# Patient Record
Sex: MALE | Race: WHITE | NOT HISPANIC OR LATINO | Employment: FULL TIME | URBAN - METROPOLITAN AREA
[De-identification: names, ages, dates, MRNs, and addresses within clinical notes are randomized per-mention and may not be internally consistent; named-entity substitution may affect disease eponyms.]

---

## 2018-01-11 NOTE — RESULT NOTES
Verified Results  * XR KNEE 4+ VIEW RIGHT [id number] 12:40PM Lissette Pineda Order Number: [id number]     Test Name Result Flag Reference   XR KNEE 4+ VW RIGHT (Report)     RIGHT KNEE     INDICATION: Knee pain  History of microfractures in both knees  COMPARISON: None     VIEWS: 4; 4 images     FINDINGS:     There is no acute fracture or dislocation  There is no joint effusion  There is some narrowing of the medial joint space as well as tiny marginal bone spur formation medially at the tibial plateau and femoral condyle  This is compatible with mild osteoarthritis  The lateral joint space and patellofemoral joint appear    preserved  All there is no discrete lytic or blastic bone lesion identified, the medial femoral condyle appears to have mixed sclerosis and relative lucency with a lucency noted to be just beneath the cortical surface in the weightbearing portion of the bone  This   might represent changes related to osteochondral defect  An MRI would provide increased sensitivity for detection and characterization  Soft tissues are unremarkable  IMPRESSION:     Mild osteoarthritis of the medial joint space  Questionable changes of osteochondral defect of the medial femoral condyle  MRI might be helpful for further characterization         Workstation performed: [id number]     Signed by:   Quintin Newberry MD   3/13/16

## 2018-01-11 NOTE — RESULT NOTES
Message   Recorded as Task   Date: 03/14/2016 10:07 AM, Created By: Dami Galvan   Task Name: Call Patient with results   Assigned To: Miguel Turner   Regarding Patient: Dayna Fam, Status: Active   CommentJfredy Coombs - 14 Mar 2016 10:07 AM     Patient Phone: [phone]    give results, get progress report- if still haviing pain, may need MRI   Sandip Vallecillo - 15 Mar 2016 2:32 PM     TASK EDITED  Saw Dr Jovan Rangel- Ortho, Had Cortisone injection and Given Rx for anti-inflammatory  Dr Jovan Rangel will be taking over care of his knee from here     FYI to PM        Signatures   Electronically signed by : Blu Cruz, ; Mar 15 2016  2:32PM EST                       (Author)

## 2018-03-14 ENCOUNTER — OFFICE VISIT (OUTPATIENT)
Dept: FAMILY MEDICINE CLINIC | Facility: CLINIC | Age: 58
End: 2018-03-14
Payer: COMMERCIAL

## 2018-03-14 VITALS
BODY MASS INDEX: 25.01 KG/M2 | WEIGHT: 165 LBS | HEART RATE: 64 BPM | SYSTOLIC BLOOD PRESSURE: 120 MMHG | RESPIRATION RATE: 16 BRPM | DIASTOLIC BLOOD PRESSURE: 80 MMHG | HEIGHT: 68 IN | OXYGEN SATURATION: 97 %

## 2018-03-14 DIAGNOSIS — Z12.11 ENCOUNTER FOR SCREENING COLONOSCOPY: ICD-10-CM

## 2018-03-14 DIAGNOSIS — Z13.6 SCREENING FOR CARDIOVASCULAR CONDITION: ICD-10-CM

## 2018-03-14 DIAGNOSIS — Z12.5 ENCOUNTER FOR PROSTATE CANCER SCREENING: ICD-10-CM

## 2018-03-14 DIAGNOSIS — Z00.00 ENCOUNTER FOR WELLNESS EXAMINATION IN ADULT: Primary | ICD-10-CM

## 2018-03-14 DIAGNOSIS — Z11.59 ENCOUNTER FOR HEPATITIS C SCREENING TEST FOR LOW RISK PATIENT: ICD-10-CM

## 2018-03-14 PROBLEM — M17.0 PRIMARY OSTEOARTHRITIS OF BOTH KNEES: Status: ACTIVE | Noted: 2018-03-14

## 2018-03-14 PROCEDURE — 99396 PREV VISIT EST AGE 40-64: CPT | Performed by: FAMILY MEDICINE

## 2018-03-14 NOTE — PROGRESS NOTES
HPI:  Shira Valdez is a 60 y o  male here for his yearly health maintenance exam    Patient Active Problem List   Diagnosis    Primary osteoarthritis of both knees     No past medical history on file  1  Advanced Directive: no     2  Durable Power of  for Healthcare: no     3  Social History:               Marital History:               Work Status: full time              Drug and alcohol History: no drugs              Alcohol Use: 1 beer/day     4  General Health: good              Regular Dental Visits:yes              Vision problems:no              Hearing Loss:no              Immunizations up to date:  yes                 Lifestyle:                           Healthy Diet:yes                          Tobacco Stacia Goldsteing                          Regular exercise:yes                          Weight concerns:no                          Drug abuse: no     5  Reproductive Health (females only)              Premenopausal:-              Perimenopausal:-              Postmenopausal: -                 Menstrual Problems: -              Contraceptions: -              Sexually Active:yes              Pregnancy History:-     6  Over the past 2 weeks, how often have you been bothered by the following:              Little interest or pleasure in doing things:not at all              Felling down, depressed or hopeless: not at all    No current outpatient prescriptions on file  No current facility-administered medications for this visit        No Known Allergies  Immunization History   Administered Date(s) Administered    Influenza Quadrivalent Preservative Free 3 years and older IM 10/15/2014    Tdap 08/31/2012    Tuberculin Skin Test-PPD Intradermal 09/05/2012       Patient Care Team:  Rosemary Timmons MD as PCP - General (Family Medicine)      Physical Exam :/80 (BP Location: Left arm, Patient Position: Sitting, Cuff Size: Standard)   Pulse 64   Resp 16   Ht 5' 8" (1 727 m)   Wt 74 8 kg (165 lb) SpO2 97%   BMI 25 09 kg/m²     General Appearance:    Alert, cooperative, no distress, appears stated age   Head:    Normocephalic, without obvious abnormality, atraumatic   Eyes:    PERRL, conjunctiva/corneas clear, EOM's intact, fundi     benign, both eyes        Ears:    Normal TM's and external ear canals, both ears   Nose:   Nares normal, septum midline, mucosa normal, no drainage    or sinus tenderness   Throat:   Lips, mucosa, and tongue normal; teeth and gums normal   Neck:   Supple, symmetrical, trachea midline, no adenopathy;        thyroid:  No enlargement/tenderness/nodules; no carotid    bruit or JVD   Lungs:     Clear to auscultation bilaterally, respirations unlabored   Chest wall:    No tenderness or deformity   Heart:    Regular rate and rhythm, S1 and S2 normal, no murmur, rub   or gallop   Abdomen:     Soft, non-tender, bowel sounds active all four quadrants,     no masses, no organomegaly   Extremities:   Extremities normal, atraumatic, no cyanosis or edema   Pulses:   2+ and symmetric all extremities   Skin:   Skin color, texture, turgor normal, no rashes or lesions   Lymph nodes:   Cervical and supraclavicular nodes normal   Neurologic:   CNII-XII intact  Normal strength, sensation and reflexes       throughout       Physical Exam     Reviewed Updated [facility] Prior Wellness Visits:   Last Health Maintenance visit information was reviewed, patient interviewed , no change since last  visit n/a  Last  visit information was reviewed, patient interviewed and updates made to the record today n/a    Assessment and Plan:  1  Encounter for wellness examination in adult     2  Encounter for hepatitis C screening test for low risk patient  Hepatitis C Ab W/Refl To HCV RNA, Qn, PCR    Hepatitis C Ab W/Refl To HCV RNA, Qn, PCR   3  Encounter for prostate cancer screening  PSA    PSA   4  Encounter for screening colonoscopy  Ambulatory referral to Gastroenterology   5   Screening for cardiovascular condition  Comprehensive metabolic panel    Lipid panel    Comprehensive metabolic panel    Lipid panel       There are no preventive care reminders to display for this patient  Patient Instructions   Health maintenance exam-will screen for hepatitis-C, hyperlipidemia, prostate cancer as discussed  Referral for repeat colonoscopy due to previous polyps  Advanced directives were discussed and forms are provided  Should have routine eye exams every 1-2 years  Maintain healthy lifestyle and diet  Vaccines are current

## 2018-03-14 NOTE — PATIENT INSTRUCTIONS
Health maintenance exam-will screen for hepatitis-C, hyperlipidemia, prostate cancer as discussed  Referral for repeat colonoscopy due to previous polyps  Advanced directives were discussed and forms are provided  Should have routine eye exams every 1-2 years  Maintain healthy lifestyle and diet  Vaccines are current

## 2018-03-28 ENCOUNTER — TELEPHONE (OUTPATIENT)
Dept: FAMILY MEDICINE CLINIC | Facility: CLINIC | Age: 58
End: 2018-03-28

## 2018-03-28 LAB
ALBUMIN SERPL-MCNC: 4.4 G/DL (ref 3.6–5.1)
ALBUMIN/GLOB SERPL: 2 (CALC) (ref 1–2.5)
ALP SERPL-CCNC: 32 U/L (ref 40–115)
ALT SERPL-CCNC: 17 U/L (ref 9–46)
AST SERPL-CCNC: 21 U/L (ref 10–35)
BILIRUB SERPL-MCNC: 0.6 MG/DL (ref 0.2–1.2)
BUN SERPL-MCNC: 23 MG/DL (ref 7–25)
BUN/CREAT SERPL: ABNORMAL (CALC) (ref 6–22)
CALCIUM SERPL-MCNC: 9.2 MG/DL (ref 8.6–10.3)
CHLORIDE SERPL-SCNC: 103 MMOL/L (ref 98–110)
CHOLEST SERPL-MCNC: 209 MG/DL
CHOLEST/HDLC SERPL: 2.6 (CALC)
CO2 SERPL-SCNC: 31 MMOL/L (ref 20–31)
CREAT SERPL-MCNC: 1.05 MG/DL (ref 0.7–1.33)
GLOBULIN SER CALC-MCNC: 2.2 G/DL (CALC) (ref 1.9–3.7)
GLUCOSE SERPL-MCNC: 91 MG/DL (ref 65–99)
HCV AB S/CO SERPL IA: 0.02
HCV AB SERPL QL IA: NORMAL
HDLC SERPL-MCNC: 80 MG/DL
LDLC SERPL CALC-MCNC: 110 MG/DL (CALC)
NONHDLC SERPL-MCNC: 129 MG/DL (CALC)
POTASSIUM SERPL-SCNC: 4.6 MMOL/L (ref 3.5–5.3)
PROT SERPL-MCNC: 6.6 G/DL (ref 6.1–8.1)
PSA SERPL-MCNC: 0.8 NG/ML
SL AMB EGFR AFRICAN AMERICAN: 90 ML/MIN/1.73M2
SL AMB EGFR NON AFRICAN AMERICAN: 77 ML/MIN/1.73M2
SODIUM SERPL-SCNC: 140 MMOL/L (ref 135–146)
TRIGL SERPL-MCNC: 92 MG/DL

## 2018-03-28 NOTE — TELEPHONE ENCOUNTER
PATIENT NOTIFIED AS PER PM      ----- Message from Kofi Seay MD sent at 3/28/2018  1:04 PM EDT -----  Call patient with lab result-  Labs look good  Borderline cholesterol, no meds needed, needs to improve her diet  Recheck 1 year

## 2018-03-28 NOTE — PROGRESS NOTES
Call patient with lab result-  Labs look good  Borderline cholesterol, no meds needed, needs to improve her diet  Recheck 1 year

## 2018-10-01 ENCOUNTER — OFFICE VISIT (OUTPATIENT)
Dept: FAMILY MEDICINE CLINIC | Facility: CLINIC | Age: 58
End: 2018-10-01
Payer: COMMERCIAL

## 2018-10-01 VITALS
SYSTOLIC BLOOD PRESSURE: 124 MMHG | WEIGHT: 160 LBS | BODY MASS INDEX: 24.25 KG/M2 | HEART RATE: 65 BPM | OXYGEN SATURATION: 97 % | DIASTOLIC BLOOD PRESSURE: 74 MMHG | HEIGHT: 68 IN

## 2018-10-01 DIAGNOSIS — Z11.1 SCREENING EXAMINATION FOR PULMONARY TUBERCULOSIS: Primary | ICD-10-CM

## 2018-10-01 DIAGNOSIS — L98.9 SKIN LESION: ICD-10-CM

## 2018-10-01 PROCEDURE — 86580 TB INTRADERMAL TEST: CPT

## 2018-10-01 PROCEDURE — 99213 OFFICE O/P EST LOW 20 MIN: CPT | Performed by: FAMILY MEDICINE

## 2018-10-01 NOTE — PROGRESS NOTES
[id number]         NAME: Maxwell Morrow is a 60 y o  male  : 1960    MRN: [id number]  DATE: 2018  TIME: 8:43 AM    Assessment and Plan   Screening examination for pulmonary tuberculosis [Z11 1]  1  Screening examination for pulmonary tuberculosis  TB Skin Test   2  Skin lesion           Patient Instructions     Patient Instructions   L flank lesion---? Basal cell--ck 6 [id number]          Chief Complaint     Chief Complaint   Patient presents with    employment PE     needs PPD         History of Present Illness       Work clearance        Review of Systems   Review of Systems   Constitutional: Negative for fatigue, fever and unexpected weight change  HENT: Negative for congestion, sinus pain and sore throat  Eyes: Negative for visual disturbance  Respiratory: Negative for shortness of breath and wheezing  Cardiovascular: Negative for chest pain and palpitations  Gastrointestinal: Negative for abdominal pain, nausea and vomiting  Musculoskeletal: Negative  Negative for arthralgias and myalgias  Neurological: Negative for syncope, weakness and numbness  Psychiatric/Behavioral: Negative  Negative for confusion, dysphoric mood and suicidal ideas  Current Medications     No current outpatient prescriptions on file  Current Allergies     Allergies as of 10/01/2018    (No Known Allergies)            The following portions of the patient's history were reviewed and updated as appropriate: allergies, current medications, past family history, past medical history, past social history, past surgical history and problem list      No past medical history on file  No past surgical history on file  Family History   Problem Relation Age of Onset    Diabetes Father     Substance Abuse Neg Hx     Mental illness Neg Hx          Medications have been verified          Objective   /74   Pulse 65   Ht 5' 8" (1 727 m)   Wt 72 6 kg (160 lb)   SpO2 97%   BMI 24 33 kg/m²        Physical Exam     Physical Exam   Constitutional: He is oriented to person, place, and time  Vital signs are normal  He appears well-developed and well-nourished  HENT:   Right Ear: Ear canal normal  Tympanic membrane is not injected  Left Ear: Ear canal normal  Tympanic membrane is not injected  Nose: Nose normal    Mouth/Throat: Oropharynx is clear and moist    Eyes: Pupils are equal, round, and reactive to light  Conjunctivae and EOM are normal  Right eye exhibits no discharge  Left eye exhibits no discharge  Neck: Normal range of motion  Neck supple  No thyromegaly present  Cardiovascular: Normal rate, regular rhythm and normal heart sounds  No murmur heard  Pulmonary/Chest: Effort normal and breath sounds normal  No respiratory distress  He has no wheezes  Abdominal: Soft  Bowel sounds are normal  He exhibits no distension  There is no tenderness  Musculoskeletal: Normal range of motion  Lymphadenopathy:     He has no cervical adenopathy  Neurological: He is alert and oriented to person, place, and time  He has normal strength and normal reflexes  He is not disoriented  No sensory deficit  Gait normal    Skin: Skin is warm and dry  Lesion L flank--? Basal cell   Psychiatric: He has a normal mood and affect   His speech is normal and behavior is normal  Judgment and thought content normal  Cognition and memory are normal

## 2018-10-03 LAB
INDURATION: 0 MM
TB SKIN TEST: NEGATIVE

## 2018-11-26 ENCOUNTER — TELEPHONE (OUTPATIENT)
Dept: FAMILY MEDICINE CLINIC | Facility: CLINIC | Age: 58
End: 2018-11-26

## 2018-11-26 NOTE — TELEPHONE ENCOUNTER
SPOKE WITH PATIENT OFFERED APPT NEXT DAY AT 930AM-- PT STATES CANNOT MAKE IT-- OFFERED THAT PATIENT CAN ALSO BEEN SEEN IN URGENT CARE IF WOULD LIKE TO BE SEEN TODAY

## 2020-03-10 ENCOUNTER — TELEPHONE (OUTPATIENT)
Dept: ADMINISTRATIVE | Facility: OTHER | Age: 60
End: 2020-03-10

## 2020-03-10 NOTE — TELEPHONE ENCOUNTER
----- [facility] sent at 3/9/2020 11:51 AM EDT -----  Regarding: COLONOSCOPY  Contact: [phone]  03/09/20 11:51 AM    Hello, our patient Breezy Hedrick has had CRC: Colonoscopy completed/performed  Please assist in updating the patient chart by pulling the document from the Media Tab  The date of service is 04/26/2018       Thank you,  Mary Montgomery  [facility]

## 2020-03-10 NOTE — LETTER
Procedure Request Form: Colonoscopy      Date Requested: 03/10/20  Patient: Annye Cebolla  Patient : 1960   Referring Provider: Regi Poisson, MD        Date of Procedure ___18 Procedure notes___________________________       The above patient has informed us that they have completed their   most recent Colonoscopy at your facility  Please complete   this form and attach all corresponding procedure reports/results  Comments __________________________________________________________  ____________________________________________________________________  ____________________________________________________________________  ____________________________________________________________________    Facility Completing Procedure _________________________________________    Form Completed By (print name) _______________________________________      Signature __________________________________________________________      These reports are needed for  compliance    Please fax this completed form and a copy of the procedure report to our office located at [place] [address] as soon as possible to [phone] attention Gerson Billy: Phone [phone]    We thank you for your assistance in treating our mutual patient

## 2020-03-10 NOTE — TELEPHONE ENCOUNTER
Upon review of the In Basket request and the patient's chart, initial outreach has been made via fax, please see Contacts section for details  A second outreach attempt will be made within 3 business days    Daria Wood   [phone]       Thank you  Yolanda Calvert MA

## 2020-03-17 NOTE — TELEPHONE ENCOUNTER
Upon review of the In Basket request we were able to locate, review, and update the patient chart as requested for CRC: Colonoscopy  Any additional questions or concerns should be emailed to the Practice Liaisons via [email or web address] email, please do not reply via In Basket      Thank you  Henry Humphreys MA

## 2020-06-12 ENCOUNTER — TELEPHONE (OUTPATIENT)
Dept: FAMILY MEDICINE CLINIC | Facility: CLINIC | Age: 60
End: 2020-06-12

## 2020-06-12 DIAGNOSIS — Z12.5 SCREENING PSA (PROSTATE SPECIFIC ANTIGEN): ICD-10-CM

## 2020-06-12 DIAGNOSIS — E78.5 HYPERLIPIDEMIA, UNSPECIFIED HYPERLIPIDEMIA TYPE: Primary | ICD-10-CM

## 2020-06-24 ENCOUNTER — OFFICE VISIT (OUTPATIENT)
Dept: FAMILY MEDICINE CLINIC | Facility: CLINIC | Age: 60
End: 2020-06-24
Payer: COMMERCIAL

## 2020-06-24 VITALS
WEIGHT: 155 LBS | HEIGHT: 68 IN | SYSTOLIC BLOOD PRESSURE: 122 MMHG | BODY MASS INDEX: 23.49 KG/M2 | TEMPERATURE: 97.5 F | OXYGEN SATURATION: 96 % | DIASTOLIC BLOOD PRESSURE: 74 MMHG | HEART RATE: 70 BPM

## 2020-06-24 DIAGNOSIS — Z00.00 ANNUAL PHYSICAL EXAM: Primary | ICD-10-CM

## 2020-06-24 LAB
ALBUMIN SERPL-MCNC: 4.2 G/DL (ref 3.8–4.8)
ALBUMIN/GLOB SERPL: 2.5 {RATIO} (ref 1.2–2.2)
ALP SERPL-CCNC: 37 IU/L (ref 39–117)
ALT SERPL-CCNC: 19 IU/L (ref 0–44)
AST SERPL-CCNC: 23 IU/L (ref 0–40)
BILIRUB SERPL-MCNC: 0.5 MG/DL (ref 0–1.2)
BUN SERPL-MCNC: 19 MG/DL (ref 8–27)
BUN/CREAT SERPL: 22 (ref 10–24)
CALCIUM SERPL-MCNC: 8.8 MG/DL (ref 8.6–10.2)
CHLORIDE SERPL-SCNC: 101 MMOL/L (ref 96–106)
CHOLEST SERPL-MCNC: 193 MG/DL (ref 100–199)
CO2 SERPL-SCNC: 26 MMOL/L (ref 20–29)
CREAT SERPL-MCNC: 0.87 MG/DL (ref 0.76–1.27)
GLOBULIN SER-MCNC: 1.7 G/DL (ref 1.5–4.5)
GLUCOSE SERPL-MCNC: 89 MG/DL (ref 65–99)
HDLC SERPL-MCNC: 67 MG/DL
LDLC SERPL CALC-MCNC: 94 MG/DL (ref 0–99)
LDLC/HDLC SERPL: 1.4 RATIO (ref 0–3.6)
POTASSIUM SERPL-SCNC: 4.4 MMOL/L (ref 3.5–5.2)
PROT SERPL-MCNC: 5.9 G/DL (ref 6–8.5)
PSA SERPL-MCNC: 0.8 NG/ML (ref 0–4)
SL AMB EGFR AFRICAN AMERICAN: 108 ML/MIN/1.73
SL AMB EGFR NON AFRICAN AMERICAN: 93 ML/MIN/1.73
SL AMB REFLEX CRITERIA: NORMAL
SL AMB VLDL CHOLESTEROL CALC: 32 MG/DL (ref 5–40)
SODIUM SERPL-SCNC: 139 MMOL/L (ref 134–144)
TRIGL SERPL-MCNC: 158 MG/DL (ref 0–149)

## 2020-06-24 PROCEDURE — 99396 PREV VISIT EST AGE 40-64: CPT | Performed by: FAMILY MEDICINE

## 2020-06-24 PROCEDURE — 3008F BODY MASS INDEX DOCD: CPT | Performed by: FAMILY MEDICINE

## 2022-01-06 DIAGNOSIS — Z12.5 SCREENING PSA (PROSTATE SPECIFIC ANTIGEN): ICD-10-CM

## 2022-01-06 DIAGNOSIS — Z00.00 ANNUAL PHYSICAL EXAM: ICD-10-CM

## 2022-01-06 DIAGNOSIS — E78.5 HYPERLIPIDEMIA, UNSPECIFIED HYPERLIPIDEMIA TYPE: Primary | ICD-10-CM

## 2022-01-26 ENCOUNTER — OFFICE VISIT (OUTPATIENT)
Dept: FAMILY MEDICINE CLINIC | Facility: CLINIC | Age: 62
End: 2022-01-26
Payer: COMMERCIAL

## 2022-01-26 VITALS
HEIGHT: 68 IN | DIASTOLIC BLOOD PRESSURE: 80 MMHG | HEART RATE: 72 BPM | BODY MASS INDEX: 24.52 KG/M2 | SYSTOLIC BLOOD PRESSURE: 122 MMHG | TEMPERATURE: 98.1 F | WEIGHT: 161.8 LBS | OXYGEN SATURATION: 97 %

## 2022-01-26 DIAGNOSIS — N52.9 ERECTILE DYSFUNCTION, UNSPECIFIED ERECTILE DYSFUNCTION TYPE: ICD-10-CM

## 2022-01-26 DIAGNOSIS — Z00.00 ANNUAL PHYSICAL EXAM: Primary | ICD-10-CM

## 2022-01-26 DIAGNOSIS — Z01.818 PRE-OP EXAMINATION: ICD-10-CM

## 2022-01-26 DIAGNOSIS — M17.0 PRIMARY OSTEOARTHRITIS OF BOTH KNEES: ICD-10-CM

## 2022-01-26 DIAGNOSIS — E78.5 BORDERLINE HYPERLIPIDEMIA: ICD-10-CM

## 2022-01-26 DIAGNOSIS — Z12.5 ENCOUNTER FOR PROSTATE CANCER SCREENING: ICD-10-CM

## 2022-01-26 PROCEDURE — 99396 PREV VISIT EST AGE 40-64: CPT | Performed by: FAMILY MEDICINE

## 2022-01-26 PROCEDURE — 3008F BODY MASS INDEX DOCD: CPT | Performed by: FAMILY MEDICINE

## 2022-01-26 PROCEDURE — 1036F TOBACCO NON-USER: CPT | Performed by: FAMILY MEDICINE

## 2022-01-26 PROCEDURE — 99214 OFFICE O/P EST MOD 30 MIN: CPT | Performed by: FAMILY MEDICINE

## 2022-01-26 PROCEDURE — 3725F SCREEN DEPRESSION PERFORMED: CPT | Performed by: FAMILY MEDICINE

## 2022-01-26 RX ORDER — SILDENAFIL 100 MG/1
100 TABLET, FILM COATED ORAL DAILY PRN
Qty: 10 TABLET | Refills: 0 | Status: SHIPPED | OUTPATIENT
Start: 2022-01-26

## 2022-01-26 NOTE — PROGRESS NOTES
Subjective:   Chief Complaint   Patient presents with    Physical Exam        Patient ID: Amie Singh is a 57 y o  male  Patient here for preop evaluation  Procedure is partial right knee replacement  Tentative date surgery 02/09/2022  Operating surgeon Dr Rashmi Toure  [facility]   Pre-admission testing is reviewed and is satisfactory  Patient also requesting medication for ED  Does have laboratory ordered outside if his pre-admission testing  The following portions of the patient's history were reviewed and updated as appropriate: allergies, current medications, past family history, past medical history, past social history, past surgical history and problem list     Review of Systems   Constitutional: Negative for activity change, appetite change, diaphoresis and fatigue  Respiratory: Negative for cough, chest tightness, shortness of breath and wheezing  Cardiovascular: Negative for chest pain, palpitations and leg swelling  Fast or slow heart rate   Gastrointestinal: Negative for abdominal pain, blood in stool, constipation, diarrhea, nausea and vomiting  Genitourinary: Negative for difficulty urinating, dysuria, frequency and hematuria  Musculoskeletal: Positive for arthralgias  Negative for gait problem, joint swelling and myalgias  Neurological: Negative for dizziness, light-headedness and headaches  Psychiatric/Behavioral: Negative for agitation, confusion, dysphoric mood and sleep disturbance  The patient is not nervous/anxious  Objective:  Vitals:    01/26/22 0747   BP: 122/80   BP Location: Left arm   Patient Position: Sitting   Cuff Size: Standard   Pulse: 72   Temp: 98 1 °F (36 7 °C)   TempSrc: Oral   SpO2: 97%   Weight: 73 4 kg (161 lb 12 8 oz)   Height: 5' 8" (1 727 m)      Physical Exam  Vitals reviewed  Constitutional:       General: He is not in acute distress  Appearance: He is well-developed     HENT:      Head: Normocephalic and atraumatic  Right Ear: Tympanic membrane and external ear normal  No drainage  Left Ear: Tympanic membrane normal  No drainage  Mouth/Throat:      Pharynx: No oropharyngeal exudate  Eyes:      General:         Right eye: No discharge  Left eye: No discharge  Conjunctiva/sclera: Conjunctivae normal    Neck:      Thyroid: No thyromegaly  Cardiovascular:      Rate and Rhythm: Normal rate and regular rhythm  Heart sounds: Normal heart sounds  Pulmonary:      Effort: Pulmonary effort is normal  No respiratory distress  Breath sounds: No wheezing or rales  Musculoskeletal:      Cervical back: Normal range of motion and neck supple  Lymphadenopathy:      Cervical: No cervical adenopathy  Psychiatric:         Mood and Affect: Mood normal          Behavior: Behavior normal            Assessment/Plan:    No problem-specific Assessment & Plan notes found for this encounter  Diagnoses and all orders for this visit:    Annual physical exam    Pre-op examination    Primary osteoarthritis of both knees    Erectile dysfunction, unspecified erectile dysfunction type  -     TSH, 3rd generation with Free T4 reflex; Future  -     sildenafil (VIAGRA) 100 mg tablet; Take 1 tablet (100 mg total) by mouth daily as needed for erectile dysfunction  -     TSH, 3rd generation with Free T4 reflex    Borderline hyperlipidemia  -     Lipid Panel with Direct LDL reflex; Future  -     Lipid Panel with Direct LDL reflex    Encounter for prostate cancer screening  -     PSA Total (Reflex To Free); Future  -     PSA Total (Reflex To Free)          Medical management-get labs as previously ordered  Patient is medically cleared for proposed right knee surgery  Forms are completed and fax operating surgeon

## 2022-01-26 NOTE — PATIENT INSTRUCTIONS
Medical management-get labs as previously ordered  Patient is medically cleared for proposed right knee surgery  Forms are completed and fax operating surgeon  Wellness Visit for Adults   AMBULATORY CARE:   A wellness visit  is when you see your healthcare provider to get screened for health problems  Your healthcare provider will also give you advice on how to stay healthy  Write down your questions so you remember to ask them  Ask your healthcare provider how often you should have a wellness visit  What happens at a wellness visit:  Your healthcare provider will ask about your health, and your family history of health problems  This includes high blood pressure, heart disease, and cancer  He or she will ask if you have symptoms that concern you, if you smoke, and about your mood  You may also be asked about your intake of medicines, supplements, food, and alcohol  Any of the following may be done:  · Your weight  will be checked  Your height may also be checked so your body mass index (BMI) can be calculated  Your BMI shows if you are at a healthy weight  · Your blood pressure  and heart rate will be checked  Your temperature may also be checked  · Blood and urine tests  may be done  Blood tests may be done to check your cholesterol levels  Abnormal cholesterol levels increase your risk for heart disease and stroke  You may also need a blood or urine test to check for diabetes if you are at increased risk  Urine tests may be done to look for signs of an infection or kidney disease  · A physical exam  includes checking your heartbeat and lungs with a stethoscope  Your healthcare provider may also check your skin to look for sun damage  · Screening tests  may be recommended  A screening test is done to check for diseases that may not cause symptoms  The screening tests you may need depend on your age, gender, family history, and lifestyle habits   For example, colorectal screening may be recommended if you are 48years old or older  Screening tests you need if you are a woman:   · A Pap smear  is used to screen for cervical cancer  Pap smears are usually done every 3 to 5 years depending on your age  You may need them more often if you have had abnormal Pap smear test results in the past  Ask your healthcare provider how often you should have a Pap smear  · A mammogram  is an x-ray of your breasts to screen for breast cancer  Experts recommend mammograms every 2 years starting at age 48 years  You may need a mammogram at age 52 years or younger if you have an increased risk for breast cancer  Talk to your healthcare provider about when you should start having mammograms and how often you need them  Vaccines you may need:   · Get an influenza vaccine  every year  The influenza vaccine protects you from the flu  Several types of viruses cause the flu  The viruses change over time, so new vaccines are made each year  · Get a tetanus-diphtheria (Td) booster vaccine  every 10 years  This vaccine protects you against tetanus and diphtheria  Tetanus is a severe infection that may cause painful muscle spasms and lockjaw  Diphtheria is a severe bacterial infection that causes a thick covering in the back of your mouth and throat  · Get a human papillomavirus (HPV) vaccine  if you are female and aged 23 to 32 or male 23 to 24 and never received it  This vaccine protects you from HPV infection  HPV is the most common infection spread by sexual contact  HPV may also cause vaginal, penile, and anal cancers  · Get a pneumococcal vaccine  if you are aged 72 years or older  The pneumococcal vaccine is an injection given to protect you from pneumococcal disease  Pneumococcal disease is an infection caused by pneumococcal bacteria  The infection may cause pneumonia, meningitis, or an ear infection  · Get a shingles vaccine  if you are 60 or older, even if you have had shingles before   The shingles vaccine is an injection to protect you from the varicella-zoster virus  This is the same virus that causes chickenpox  Shingles is a painful rash that develops in people who had chickenpox or have been exposed to the virus  How to eat healthy:  My Plate is a model for planning healthy meals  It shows the types and amounts of foods that should go on your plate  Fruits and vegetables make up about half of your plate, and grains and protein make up the other half  A serving of dairy is included on the side of your plate  The amount of calories and serving sizes you need depends on your age, gender, weight, and height  Examples of healthy foods are listed below:  · Eat a variety of vegetables  such as dark green, red, and orange vegetables  You can also include canned vegetables low in sodium (salt) and frozen vegetables without added butter or sauces  · Eat a variety of fresh fruits , canned fruit in 100% juice, frozen fruit, and dried fruit  · Include whole grains  At least half of the grains you eat should be whole grains  Examples include whole-wheat bread, wheat pasta, brown rice, and whole-grain cereals such as oatmeal     · Eat a variety of protein foods such as seafood (fish and shellfish), lean meat, and poultry without skin (turkey and chicken)  Examples of lean meats include pork leg, shoulder, or tenderloin, and beef round, sirloin, tenderloin, and extra lean ground beef  Other protein foods include eggs and egg substitutes, beans, peas, soy products, nuts, and seeds  · Choose low-fat dairy products such as skim or 1% milk or low-fat yogurt, cheese, and cottage cheese  · Limit unhealthy fats  such as butter, hard margarine, and shortening  Exercise:  Exercise at least 30 minutes per day on most days of the week  Some examples of exercise include walking, biking, dancing, and swimming   You can also fit in more physical activity by taking the stairs instead of the elevator or parking farther away from stores  Include muscle strengthening activities 2 days each week  Regular exercise provides many health benefits  It helps you manage your weight, and decreases your risk for type 2 diabetes, heart disease, stroke, and high blood pressure  Exercise can also help improve your mood  Ask your healthcare provider about the best exercise plan for you  General health and safety guidelines:   · Do not smoke  Nicotine and other chemicals in cigarettes and cigars can cause lung damage  Ask your healthcare provider for information if you currently smoke and need help to quit  E-cigarettes or smokeless tobacco still contain nicotine  Talk to your healthcare provider before you use these products  · Limit alcohol  A drink of alcohol is 12 ounces of beer, 5 ounces of wine, or 1½ ounces of liquor  · Lose weight, if needed  Being overweight increases your risk of certain health conditions  These include heart disease, high blood pressure, type 2 diabetes, and certain types of cancer  · Protect your skin  Do not sunbathe or use tanning beds  Use sunscreen with a SPF 15 or higher  Apply sunscreen at least 15 minutes before you go outside  Reapply sunscreen every 2 hours  Wear protective clothing, hats, and sunglasses when you are outside  · Drive safely  Always wear your seatbelt  Make sure everyone in your car wears a seatbelt  A seatbelt can save your life if you are in an accident  Do not use your cell phone when you are driving  This could distract you and cause an accident  Pull over if you need to make a call or send a text message  · Practice safe sex  Use latex condoms if are sexually active and have more than one partner  Your healthcare provider may recommend screening tests for sexually transmitted infections (STIs)  · Wear helmets, lifejackets, and protective gear    Always wear a helmet when you ride a bike or motorcycle, go skiing, or play sports that could cause a head injury  Wear protective equipment when you play sports  Wear a lifejacket when you are on a boat or doing water sports  © Copyright [employer] 2021 Information is for End User's use only and may not be sold, redistributed or otherwise used for commercial purposes  All illustrations and images included in CareNotes® are the copyrighted property of [employer]  or [facility] Benigno Lee   The above information is an  only  It is not intended as medical advice for individual conditions or treatments  Talk to your doctor, nurse or pharmacist before following any medical regimen to see if it is safe and effective for you

## 2022-01-26 NOTE — PROGRESS NOTES
[address]    NAME: Emmanuel Barrientos  AGE: 57 y o  SEX: male  : 1960     DATE: 2022     Assessment and Plan:     Problem List Items Addressed This Visit     None      Visit Diagnoses     Annual physical exam    -  Primary          Immunizations and preventive care screenings were discussed with patient today  Appropriate education was printed on patient's after visit summary  Counseling:  Alcohol/drug use: discussed moderation in alcohol intake, the recommendations for healthy alcohol use, and avoidance of illicit drug use  Dental Health: discussed importance of regular tooth brushing, flossing, and dental visits  Injury prevention: discussed safety/seat belts, safety helmets, smoke detectors, carbon dioxide detectors, and smoking near bedding or upholstery  · Exercise: the importance of regular exercise/physical activity was discussed  Recommend exercise 3-5 times per week for at least 30 minutes  Depression Screening and Follow-up Plan: Patient was screened for depression during today's encounter  They screened negative with a PHQ-2 score of 0  No follow-ups on file  Chief Complaint:     Chief Complaint   Patient presents with    Physical Exam      History of Present Illness:     Adult Annual Physical   Patient here for a comprehensive physical exam  The patient reports see list     Diet and Physical Activity  · Diet/Nutrition: well balanced diet, limited junk food and consuming 3-5 servings of fruits/vegetables daily  · Exercise: moderate cardiovascular exercise  Depression Screening  PHQ-2/9 Depression Screening    Little interest or pleasure in doing things: 0 - not at all  Feeling down, depressed, or hopeless: 0 - not at all  PHQ-2 Score: 0  PHQ-2 Interpretation: Negative depression screen       General Health  · Sleep: sleeps well and gets 7-8 hours of sleep on average  · Hearing: normal - bilateral   · Vision: readers  · Dental: regular dental visits, brushes teeth once daily and flosses teeth occasionally   Health  · Symptoms include: nocturia X1     Review of Systems:     Review of Systems   Constitutional: Negative for activity change, appetite change, diaphoresis and fatigue  Respiratory: Negative for cough, chest tightness, shortness of breath and wheezing  Cardiovascular: Negative for chest pain, palpitations and leg swelling  Fast or slow heart rate   Gastrointestinal: Negative for abdominal pain, blood in stool, constipation, diarrhea, nausea and vomiting  Genitourinary: Negative for difficulty urinating, dysuria, frequency and hematuria  Musculoskeletal: Positive for arthralgias  Negative for gait problem, joint swelling and myalgias  Neurological: Negative for dizziness, light-headedness and headaches  Psychiatric/Behavioral: Negative for agitation, confusion, dysphoric mood and sleep disturbance  The patient is not nervous/anxious  Past Medical History:     History reviewed  No pertinent past medical history  Past Surgical History:     Past Surgical History:   Procedure Laterality Date    KNEE SURGERY      total 3 knee surgery      Family History:     Family History   Problem Relation Age of Onset    Diabetes Father     Substance Abuse Neg Hx     Mental illness Neg Hx       Social History:     Social History     Socioeconomic History    Marital status: /Civil Union     Spouse name: Anjelica Burrell Number of children: 2    Years of education: None    Highest education level: None   Occupational History    Occupation: Finance   Tobacco Use    Smoking status: Never Smoker    Smokeless tobacco: Never Used   Vaping Use    Vaping Use: Never used   Substance and Sexual Activity    Alcohol use:  Yes     Alcohol/week: 7 0 standard drinks     Types: 7 Glasses of wine per week    Drug use: No    Sexual activity: None   Other Topics Concern    None   Social History Narrative    None     Social Determinants of Health     Financial Resource Strain: Not on file   Food Insecurity: Not on file   Transportation Needs: Not on file   Physical Activity: Not on file   Stress: Not on file   Social Connections: Not on file   Intimate Partner Violence: Not on file   Housing Stability: Not on file      Current Medications:     No current outpatient medications on file  No current facility-administered medications for this visit  Allergies:     No Known Allergies   Physical Exam:     /80 (BP Location: Left arm, Patient Position: Sitting, Cuff Size: Standard)   Pulse 72   Temp 98 1 °F (36 7 °C) (Oral)   Ht 5' 8" (1 727 m)   Wt 73 4 kg (161 lb 12 8 oz)   SpO2 97%   BMI 24 60 kg/m²     Physical Exam  Vitals and nursing note reviewed  HENT:      Head: Normocephalic and atraumatic  Right Ear: External ear normal       Left Ear: External ear normal    Eyes:      General:         Right eye: No discharge  Left eye: No discharge  Pupils: Pupils are equal, round, and reactive to light  Neck:      Thyroid: No thyromegaly  Trachea: No tracheal deviation  Cardiovascular:      Rate and Rhythm: Normal rate and regular rhythm  Heart sounds: Normal heart sounds  No murmur heard  Pulmonary:      Effort: Pulmonary effort is normal  No respiratory distress  Breath sounds: Normal breath sounds  No wheezing  Abdominal:      General: Bowel sounds are normal  There is no distension  Palpations: Abdomen is soft  There is no mass  Tenderness: There is no abdominal tenderness  Musculoskeletal:         General: Normal range of motion  Cervical back: Normal range of motion and neck supple  Right knee: Deformity and bony tenderness present  Right lower leg: No edema  Left lower leg: No edema  Lymphadenopathy:      Cervical: No cervical adenopathy     Neurological:      Mental Status: He is alert and oriented to person, place, and time  Cranial Nerves: No cranial nerve deficit        Deep Tendon Reflexes: Reflexes normal    Psychiatric:         Mood and Affect: Mood normal          Behavior: Behavior normal           Dillan Jimenez MD  [id number]

## 2023-01-31 ENCOUNTER — OFFICE VISIT (OUTPATIENT)
Dept: FAMILY MEDICINE CLINIC | Facility: CLINIC | Age: 63
End: 2023-01-31

## 2023-01-31 VITALS
HEIGHT: 69 IN | SYSTOLIC BLOOD PRESSURE: 120 MMHG | TEMPERATURE: 97 F | OXYGEN SATURATION: 98 % | WEIGHT: 162 LBS | BODY MASS INDEX: 23.99 KG/M2 | RESPIRATION RATE: 18 BRPM | HEART RATE: 70 BPM | DIASTOLIC BLOOD PRESSURE: 78 MMHG

## 2023-01-31 DIAGNOSIS — Z23 NEED FOR VACCINATION: ICD-10-CM

## 2023-01-31 DIAGNOSIS — Z12.5 ENCOUNTER FOR PROSTATE CANCER SCREENING: ICD-10-CM

## 2023-01-31 DIAGNOSIS — E78.5 BORDERLINE HYPERLIPIDEMIA: ICD-10-CM

## 2023-01-31 DIAGNOSIS — Z00.00 ANNUAL PHYSICAL EXAM: Primary | ICD-10-CM

## 2023-01-31 NOTE — PATIENT INSTRUCTIONS
Wellness Visit for Adults   AMBULATORY CARE:   A wellness visit  is when you see your healthcare provider to get screened for health problems  Your healthcare provider will also give you advice on how to stay healthy  Write down your questions so you remember to ask them  Ask your healthcare provider how often you should have a wellness visit  What happens at a wellness visit:  Your healthcare provider will ask about your health, and your family history of health problems  This includes high blood pressure, heart disease, and cancer  He or she will ask if you have symptoms that concern you, if you smoke, and about your mood  You may also be asked about your intake of medicines, supplements, food, and alcohol  Any of the following may be done:  · Your weight  will be checked  Your height may also be checked so your body mass index (BMI) can be calculated  Your BMI shows if you are at a healthy weight  · Your blood pressure  and heart rate will be checked  Your temperature may also be checked  · Blood and urine tests  may be done  Blood tests may be done to check your cholesterol levels  Abnormal cholesterol levels increase your risk for heart disease and stroke  You may also need a blood or urine test to check for diabetes if you are at increased risk  Urine tests may be done to look for signs of an infection or kidney disease  · A physical exam  includes checking your heartbeat and lungs with a stethoscope  Your healthcare provider may also check your skin to look for sun damage  · Screening tests  may be recommended  A screening test is done to check for diseases that may not cause symptoms  The screening tests you may need depend on your age, gender, family history, and lifestyle habits  For example, colorectal screening may be recommended if you are 48years old or older  Screening tests you need if you are a woman:   · A Pap smear  is used to screen for cervical cancer   Pap smears are usually done every 3 to 5 years depending on your age  You may need them more often if you have had abnormal Pap smear test results in the past  Ask your healthcare provider how often you should have a Pap smear  · A mammogram  is an x-ray of your breasts to screen for breast cancer  Experts recommend mammograms every 2 years starting at age 48 years  You may need a mammogram at age 52 years or younger if you have an increased risk for breast cancer  Talk to your healthcare provider about when you should start having mammograms and how often you need them  Vaccines you may need:   · Get an influenza vaccine  every year  The influenza vaccine protects you from the flu  Several types of viruses cause the flu  The viruses change over time, so new vaccines are made each year  · Get a tetanus-diphtheria (Td) booster vaccine  every 10 years  This vaccine protects you against tetanus and diphtheria  Tetanus is a severe infection that may cause painful muscle spasms and lockjaw  Diphtheria is a severe bacterial infection that causes a thick covering in the back of your mouth and throat  · Get a human papillomavirus (HPV) vaccine  if you are female and aged 23 to 32 or male 23 to 24 and never received it  This vaccine protects you from HPV infection  HPV is the most common infection spread by sexual contact  HPV may also cause vaginal, penile, and anal cancers  · Get a pneumococcal vaccine  if you are aged 72 years or older  The pneumococcal vaccine is an injection given to protect you from pneumococcal disease  Pneumococcal disease is an infection caused by pneumococcal bacteria  The infection may cause pneumonia, meningitis, or an ear infection  · Get a shingles vaccine  if you are 60 or older, even if you have had shingles before  The shingles vaccine is an injection to protect you from the varicella-zoster virus  This is the same virus that causes chickenpox   Shingles is a painful rash that develops in people who had chickenpox or have been exposed to the virus  How to eat healthy:  My Plate is a model for planning healthy meals  It shows the types and amounts of foods that should go on your plate  Fruits and vegetables make up about half of your plate, and grains and protein make up the other half  A serving of dairy is included on the side of your plate  The amount of calories and serving sizes you need depends on your age, gender, weight, and height  Examples of healthy foods are listed below:  · Eat a variety of vegetables  such as dark green, red, and orange vegetables  You can also include canned vegetables low in sodium (salt) and frozen vegetables without added butter or sauces  · Eat a variety of fresh fruits , canned fruit in 100% juice, frozen fruit, and dried fruit  · Include whole grains  At least half of the grains you eat should be whole grains  Examples include whole-wheat bread, wheat pasta, brown rice, and whole-grain cereals such as oatmeal     · Eat a variety of protein foods such as seafood (fish and shellfish), lean meat, and poultry without skin (turkey and chicken)  Examples of lean meats include pork leg, shoulder, or tenderloin, and beef round, sirloin, tenderloin, and extra lean ground beef  Other protein foods include eggs and egg substitutes, beans, peas, soy products, nuts, and seeds  · Choose low-fat dairy products such as skim or 1% milk or low-fat yogurt, cheese, and cottage cheese  · Limit unhealthy fats  such as butter, hard margarine, and shortening  Exercise:  Exercise at least 30 minutes per day on most days of the week  Some examples of exercise include walking, biking, dancing, and swimming  You can also fit in more physical activity by taking the stairs instead of the elevator or parking farther away from stores  Include muscle strengthening activities 2 days each week  Regular exercise provides many health benefits   It helps you manage your weight, and decreases your risk for type 2 diabetes, heart disease, stroke, and high blood pressure  Exercise can also help improve your mood  Ask your healthcare provider about the best exercise plan for you  General health and safety guidelines:   · Do not smoke  Nicotine and other chemicals in cigarettes and cigars can cause lung damage  Ask your healthcare provider for information if you currently smoke and need help to quit  E-cigarettes or smokeless tobacco still contain nicotine  Talk to your healthcare provider before you use these products  · Limit alcohol  A drink of alcohol is 12 ounces of beer, 5 ounces of wine, or 1½ ounces of liquor  · Lose weight, if needed  Being overweight increases your risk of certain health conditions  These include heart disease, high blood pressure, type 2 diabetes, and certain types of cancer  · Protect your skin  Do not sunbathe or use tanning beds  Use sunscreen with a SPF 15 or higher  Apply sunscreen at least 15 minutes before you go outside  Reapply sunscreen every 2 hours  Wear protective clothing, hats, and sunglasses when you are outside  · Drive safely  Always wear your seatbelt  Make sure everyone in your car wears a seatbelt  A seatbelt can save your life if you are in an accident  Do not use your cell phone when you are driving  This could distract you and cause an accident  Pull over if you need to make a call or send a text message  · Practice safe sex  Use latex condoms if are sexually active and have more than one partner  Your healthcare provider may recommend screening tests for sexually transmitted infections (STIs)  · Wear helmets, lifejackets, and protective gear  Always wear a helmet when you ride a bike or motorcycle, go skiing, or play sports that could cause a head injury  Wear protective equipment when you play sports  Wear a lifejacket when you are on a boat or doing water sports      © Copyright [employer] 2022 Information is for End User's use only and may not be sold, redistributed or otherwise used for commercial purposes  All illustrations and images included in CareNotes® are the copyrighted property of [employer]  or Loyda Verma  The above information is an  only  It is not intended as medical advice for individual conditions or treatments  Talk to your doctor, nurse or pharmacist before following any medical regimen to see if it is safe and effective for you  Cholesterol and Your Health   AMBULATORY CARE:   Cholesterol  is a waxy, fat-like substance  Your body uses cholesterol to make hormones and new cells, and to protect nerves  Cholesterol is made by your body  It also comes from certain foods you eat, such as meat and dairy products  Your healthcare provider can help you set goals for your cholesterol levels  He or she can help you create a plan to meet your goals  Cholesterol level goals: Your cholesterol level goals depend on your risk for heart disease, your age, and your other health conditions  The following are general guidelines:  · Total cholesterol  includes low-density lipoprotein (LDL), high-density lipoprotein (HDL), and triglyceride levels  The total cholesterol level should be lower than 200 mg/dL and is best at about 150 mg/dL  · LDL cholesterol  is called bad cholesterol  because it forms plaque in your arteries  As plaque builds up, your arteries become narrow, and less blood flows through  When plaque decreases blood flow to your heart, you may have chest pain  If plaque completely blocks an artery that brings blood to your heart, you may have a heart attack  Plaque can break off and form blood clots  Blood clots may block arteries in your brain and cause a stroke  The level should be less than 130 mg/dL and is best at about 100 mg/dL  · HDL cholesterol  is called good cholesterol  because it helps remove LDL cholesterol from your arteries   It does this by attaching to LDL cholesterol and carrying it to your liver  Your liver breaks down LDL cholesterol so your body can get rid of it  High levels of HDL cholesterol can help prevent a heart attack and stroke  Low levels of HDL cholesterol can increase your risk for heart disease, heart attack, and stroke  The level should be 60 mg/dL or higher  · Triglycerides  are a type of fat that store energy from foods you eat  High levels of triglycerides also cause plaque buildup  This can increase your risk for a heart attack or stroke  If your triglyceride level is high, your LDL cholesterol level may also be high  The level should be less than 150 mg/dL  Any of the following can increase your risk for high cholesterol:   · Smoking cigarettes    · Being overweight or obese, or not getting enough exercise    · Drinking large amounts of alcohol    · A medical condition such as hypertension (high blood pressure) or diabetes    · Certain genes passed from your parents to you    · Age older than 65 years    What you need to know about having your cholesterol levels checked: Adults 21to 39years of age should have their cholesterol levels checked every 4 to 6 years  Adults 45 years or older should have their cholesterol checked every 1 to 2 years  You may need your cholesterol checked more often, or at a younger age, if you have risk factors for heart disease  You may also need to have your cholesterol checked more often if you have other health conditions, such as diabetes  Blood tests are used to check cholesterol levels  Blood tests measure your levels of triglycerides, LDL cholesterol, and HDL cholesterol  How healthy fats affect your cholesterol levels:  Healthy fats, also called unsaturated fats, help lower LDL cholesterol and triglyceride levels  Healthy fats include the following:  · Monounsaturated fats  are found in foods such as olive oil, canola oil, avocado, nuts, and olives      · Polyunsaturated fats,  such as omega 3 fats, are found in fish, such as salmon, trout, and tuna  They can also be found in plant foods such as flaxseed, walnuts, and soybeans  How unhealthy fats affect your cholesterol levels:  Unhealthy fats increase LDL cholesterol and triglyceride levels  They are found in foods high in cholesterol, saturated fat, and trans fat:  · Cholesterol  is found in eggs, dairy, and meat  · Saturated fat  is found in butter, cheese, ice cream, whole milk, and coconut oil  Saturated fat is also found in meat, such as sausage, hot dogs, and bologna  · Trans fat  is found in liquid oils and is used in fried and baked foods  Foods that contain trans fats include chips, crackers, muffins, sweet rolls, microwave popcorn, and cookies  Treatment  for high cholesterol will also decrease your risk of heart disease, heart attack, and stroke  Treatment may include any of the following:  · Lifestyle changes  may include food, exercise, weight loss, and quitting smoking  You may also need to decrease the amount of alcohol you drink  Your healthcare provider will want you to start with lifestyle changes  Other treatment may be added if lifestyle changes are not enough  Your healthcare provider may recommend you work with a team to manage hyperlipidemia  The team may include medical experts such as a dietitian, an exercise or physical therapist, and a behavior therapist  Your family members may be included in helping you create lifestyle changes  · Medicines  may be given to lower your LDL cholesterol, triglyceride levels, or total cholesterol level  You may need medicines to lower your cholesterol if any of the following is true:    ? You have a history of stroke, TIA, unstable angina, or a heart attack  ? Your LDL cholesterol level is 190 mg/dL or higher  ? You are age 36 to 76 years, have diabetes or heart disease risk factors, and your LDL cholesterol is 70 mg/dL or higher      · Supplements  include fish oil, red yeast rice, and garlic  Fish oil may help lower your triglyceride and LDL cholesterol levels  It may also increase your HDL cholesterol level  Red yeast rice may help decrease your total cholesterol level and LDL cholesterol level  Garlic may help lower your total cholesterol level  Do not take any supplements without talking to your healthcare provider  Food changes you can make to lower your cholesterol levels:  A dietitian can help you create a healthy eating plan  He or she can show you how to read food labels and choose foods low in saturated fat, trans fats, and cholesterol  · Decrease the total amount of fat you eat  Choose lean meats, fat-free or 1% fat milk, and low-fat dairy products, such as yogurt and cheese  Try to limit or avoid red meats  Limit or do not eat fried foods or baked goods, such as cookies  · Replace unhealthy fats with healthy fats  Cook foods in olive oil or canola oil  Choose soft margarines that are low in saturated fat and trans fat  Seeds, nuts, and avocados are other examples of healthy fats  · Eat foods with omega-3 fats  Examples include salmon, tuna, mackerel, walnuts, and flaxseed  Eat fish 2 times per week  Pregnant women should not eat fish that have high levels of mercury, such as shark, swordfish, and janet mackerel  · Increase the amount of high-fiber foods you eat  High-fiber foods can help lower your LDL cholesterol  Aim to get between 20 and 30 grams of fiber each day  Fruits and vegetables are high in fiber  Eat at least 5 servings each day  Other high-fiber foods are whole-grain or whole-wheat breads, pastas, or cereals, and brown rice  Eat 3 ounces of whole-grain foods each day  Increase fiber slowly  You may have abdominal discomfort, bloating, and gas if you add fiber to your diet too quickly  · Eat healthy protein foods  Examples include low-fat dairy products, skinless chicken and turkey, fish, and nuts      · Limit foods and drinks that are high in sugar  Your dietitian or healthcare provider can help you create daily limits for high-sugar foods and drinks  The limit may be lower if you have diabetes or another health condition  Limits can also help you lose weight if needed  Lifestyle changes you can make to lower your cholesterol levels:   · Maintain a healthy weight  Ask your healthcare provider what a healthy weight is for you  Ask him or her to help you create a weight loss plan if needed  Weight loss can decrease your total cholesterol and triglyceride levels  Weight loss may also help keep your blood pressure at a healthy level  · Be physically active throughout the day  Physical activity, such as exercise, can help lower your total cholesterol level and maintain a healthy weight  Physical activity can also help increase your HDL cholesterol level  Work with your healthcare provider to create an program that is right for you  Get at least 30 to 40 minutes of moderate physical activity most days of the week  Examples of exercise include brisk walking, swimming, or biking  Also include strength training at least 2 times each week  Your healthcare providers can help you create a physical activity plan  · Do not smoke  Nicotine and other chemicals in cigarettes and cigars can raise your cholesterol levels  Ask your healthcare provider for information if you currently smoke and need help to quit  E-cigarettes or smokeless tobacco still contain nicotine  Talk to your healthcare provider before you use these products  · Limit or do not drink alcohol  Alcohol can increase your triglyceride levels  Ask your healthcare provider before you drink alcohol  Ask how much is okay for you to drink in 24 hours or 1 week  Follow up with your doctor as directed:  Write down your questions so you remember to ask them during your visits    © Copyright [employer] 2022 Information is for End User's use only and may not be sold, redistributed or otherwise used for commercial purposes  All illustrations and images included in CareNotes® are the copyrighted property of [employer]  or Loyda Verma  The above information is an  only  It is not intended as medical advice for individual conditions or treatments  Talk to your doctor, nurse or pharmacist before following any medical regimen to see if it is safe and effective for you

## 2023-01-31 NOTE — PROGRESS NOTES
[address]    NAME: Cortez Adjutant  AGE: 60 y o  SEX: male  : 1960     DATE: 2023     Assessment and Plan:     Problem List Items Addressed This Visit        Other    Borderline hyperlipidemia    Relevant Orders    Comprehensive metabolic panel    Lipid Panel with Direct LDL reflex   Other Visit Diagnoses     Annual physical exam    -  Primary    Encounter for prostate cancer screening        Relevant Orders    PSA Total (Reflex To Free)    Need for vaccination        Relevant Orders    HEPATITIS A VACCINE ADULT IM (Completed)    TDAP VACCINE GREATER THAN OR EQUAL TO 6YO IM (Completed)    MENINGOCOCCAL ACYW-135 TT CONJUGATE (Completed)          Immunizations and preventive care screenings were discussed with patient today  Appropriate education was printed on patient's after visit summary  Discussed risks and benefits of prostate cancer screening  We discussed the controversial history of PSA screening for prostate cancer in the [place] as well as the risk of over detection and over treatment of prostate cancer by way of PSA screening  The patient understands that PSA blood testing is an imperfect way to screen for prostate cancer and that elevated PSA levels in the blood may also be caused by infection, inflammation, prostatic trauma or manipulation, urological procedures, or by benign prostatic enlargement  The role of the digital rectal examination in prostate cancer screening was also discussed and I discussed with him that there is large interobserver variability in the findings of digital rectal examination  Counseling:  · Alcohol/drug use: discussed moderation in alcohol intake, the recommendations for healthy alcohol use, and avoidance of illicit drug use  No follow-ups on file       Chief Complaint:     Chief Complaint   Patient presents with   • Physical Exam     Patient traveling to [place] in Feb 20-needs Hep A, TDAP, Meningitis vaccine       History of Present Illness:     Adult Annual Physical   Patient here for a comprehensive physical exam  The patient reports no problems  Needs vaccines for trip to [place]  Did see travel clinic and have some  Diet and Physical Activity  · Diet/Nutrition: well balanced diet, limited junk food and consuming 3-5 servings of fruits/vegetables daily  · Exercise: 5-7 times a week on average  Depression Screening  PHQ-2/9 Depression Screening    Little interest or pleasure in doing things: 0 - not at all  Feeling down, depressed, or hopeless: 0 - not at all  PHQ-2 Score: 0  PHQ-2 Interpretation: Negative depression screen       General Health  · Sleep: sleeps well  · Hearing: normal - bilateral   · Vision: readers  · Dental: regular dental visits, brushes teeth once daily and flosses teeth occasionally   Health  · Symptoms include: erectile dysfunction     Review of Systems:     Review of Systems   Constitutional: Negative for activity change, appetite change, diaphoresis and fatigue  HENT: Negative for congestion, sinus pressure and sore throat  Respiratory: Negative for cough, chest tightness, shortness of breath and wheezing  Cardiovascular: Negative for chest pain, palpitations and leg swelling  Fast or slow heart rate   Gastrointestinal: Negative for abdominal pain, blood in stool, constipation, diarrhea, nausea and vomiting  Genitourinary: Negative for difficulty urinating, dysuria, frequency and hematuria  Musculoskeletal: Negative for arthralgias, gait problem, joint swelling and myalgias  Neurological: Negative for dizziness, light-headedness and headaches  Psychiatric/Behavioral: Negative for agitation, confusion, dysphoric mood and sleep disturbance  The patient is not nervous/anxious         Past Medical History:     Past Medical History:   Diagnosis Date   • Allergic 1985    Hay   • Arthritis 2018    knees      Past Surgical History:     Past Surgical History:   Procedure Laterality Date   • JOINT REPLACEMENT  Knee - 2022   • KNEE SURGERY      total 3 knee surgery      Family History:     Family History   Problem Relation Age of Onset   • Diabetes Father    • Substance Abuse Neg Hx    • Mental illness Neg Hx       Social History:     Social History     Socioeconomic History   • Marital status: /Civil Union     Spouse name: Rosalinda Mullen   • Number of children: 2   • Years of education: None   • Highest education level: None   Occupational History   • Occupation: Finance   Tobacco Use   • Smoking status: Never   • Smokeless tobacco: Never   Vaping Use   • Vaping Use: Never used   Substance and Sexual Activity   • Alcohol use: Yes     Alcohol/week: 7 0 standard drinks     Types: 7 Glasses of wine per week   • Drug use: No   • Sexual activity: Not Currently   Other Topics Concern   • None   Social History Narrative   • None     Social Determinants of Health     Financial Resource Strain: Not on file   Food Insecurity: Not on file   Transportation Needs: Not on file   Physical Activity: Not on file   Stress: Not on file   Social Connections: Not on file   Intimate Partner Violence: Not on file   Housing Stability: Not on file      Current Medications:     Current Outpatient Medications   Medication Sig Dispense Refill   • sildenafil (VIAGRA) 100 mg tablet Take 1 tablet (100 mg total) by mouth daily as needed for erectile dysfunction 10 tablet 0     No current facility-administered medications for this visit  Allergies:     No Known Allergies   Physical Exam:     /78 (BP Location: Left arm, Patient Position: Sitting, Cuff Size: Adult)   Pulse 70   Temp (!) 97 °F (36 1 °C) (Tympanic)   Resp 18   Ht 5' 8 75" (1 746 m)   Wt 73 5 kg (162 lb)   SpO2 98%   BMI 24 10 kg/m²     Physical Exam  Vitals and nursing note reviewed  Constitutional:       General: He is not in acute distress  Appearance: He is not ill-appearing  HENT:      Head: Normocephalic and atraumatic  Right Ear: Tympanic membrane, ear canal and external ear normal       Left Ear: Tympanic membrane, ear canal and external ear normal       Nose: Nose normal       Mouth/Throat:      Pharynx: No posterior oropharyngeal erythema  Eyes:      General:         Right eye: No discharge  Left eye: No discharge  Extraocular Movements: Extraocular movements intact  Conjunctiva/sclera: Conjunctivae normal       Pupils: Pupils are equal, round, and reactive to light  Neck:      Thyroid: No thyromegaly  Vascular: No carotid bruit  Trachea: No tracheal deviation  Cardiovascular:      Rate and Rhythm: Normal rate and regular rhythm  Heart sounds: Normal heart sounds  No murmur heard  Pulmonary:      Effort: Pulmonary effort is normal  No respiratory distress  Breath sounds: Normal breath sounds  No wheezing  Abdominal:      General: Bowel sounds are normal  There is no distension  Palpations: Abdomen is soft  There is no mass  Tenderness: There is no abdominal tenderness  There is no guarding  Musculoskeletal:      Cervical back: Normal range of motion and neck supple  Right lower leg: No edema  Left lower leg: No edema  Lymphadenopathy:      Cervical: No cervical adenopathy  Skin:     Findings: No rash  Neurological:      General: No focal deficit present  Mental Status: He is alert and oriented to person, place, and time  Cranial Nerves: No cranial nerve deficit        Deep Tendon Reflexes: Reflexes normal    Psychiatric:         Mood and Affect: Mood normal          Behavior: Behavior normal           Kimmie Munson MD  [id number]

## 2023-03-15 ENCOUNTER — OFFICE VISIT (OUTPATIENT)
Dept: FAMILY MEDICINE CLINIC | Facility: CLINIC | Age: 63
End: 2023-03-15

## 2023-03-15 VITALS
OXYGEN SATURATION: 99 % | RESPIRATION RATE: 16 BRPM | SYSTOLIC BLOOD PRESSURE: 124 MMHG | HEIGHT: 69 IN | HEART RATE: 72 BPM | TEMPERATURE: 98.1 F | WEIGHT: 165 LBS | BODY MASS INDEX: 24.44 KG/M2 | DIASTOLIC BLOOD PRESSURE: 72 MMHG

## 2023-03-15 DIAGNOSIS — M17.0 PRIMARY OSTEOARTHRITIS OF BOTH KNEES: ICD-10-CM

## 2023-03-15 DIAGNOSIS — Z01.818 PRE-OP EXAMINATION: Primary | ICD-10-CM

## 2023-03-15 DIAGNOSIS — E78.5 BORDERLINE HYPERLIPIDEMIA: ICD-10-CM

## 2023-03-15 NOTE — PROGRESS NOTES
[id number]         NAME: Adelaida Dao is a 58 y o  male  : 1960    MRN: [id number]  DATE: March 15, 2023  TIME: 9:17 AM    Assessment and Plan   Pre-op examination [S74 010]  1  Pre-op examination        2  Primary osteoarthritis of both knees        3  Borderline hyperlipidemia            No problem-specific Assessment & Plan notes found for this encounter  Patient Instructions     Patient Instructions   Patient medically stable  No current medications  Preadmission testing is all satisfactory  Patient is cleared for proposed left knee procedure  Chief Complaint     Chief Complaint   Patient presents with   • Pre-op Exam     L partial knee replacement w/ robotic assist, possible L total knee replacement with LISA         History of Present Illness       Patient comes in for preoperative valuation  Planning to have left partial knee replacement  Operating surgeon is Dr Bay Ku  [facility]   EKG and chest x-ray reviewed are satisfactory  Preoperative blood work also satisfactory only minor abnormalities not clinically significant  Patient takes no medication  Review of Systems   Review of Systems   Constitutional: Negative for activity change, appetite change, diaphoresis and fatigue  HENT: Negative for congestion, sinus pressure and sore throat  Respiratory: Negative for cough, chest tightness, shortness of breath and wheezing  Cardiovascular: Negative for chest pain, palpitations and leg swelling  Fast or slow heart rate   Gastrointestinal: Negative for abdominal pain, blood in stool, constipation, diarrhea, nausea and vomiting  Genitourinary: Negative for difficulty urinating, dysuria, frequency and hematuria  Musculoskeletal: Negative for arthralgias, gait problem, joint swelling and myalgias  Neurological: Negative for dizziness, light-headedness and headaches     Psychiatric/Behavioral: Negative for agitation, confusion, dysphoric mood and sleep disturbance  The patient is not nervous/anxious  Current Medications       Current Outpatient Medications:   •  sildenafil (VIAGRA) 100 mg tablet, Take 1 tablet (100 mg total) by mouth daily as needed for erectile dysfunction, Disp: 10 tablet, Rfl: 0    Current Allergies     Allergies as of 03/15/2023   • (No Known Allergies)            The following portions of the patient's history were reviewed and updated as appropriate: allergies, current medications, past family history, past medical history, past social history, past surgical history and problem list      Past Medical History:   Diagnosis Date   • Allergic 1985    Hay   • Arthritis 2018    knees       Past Surgical History:   Procedure Laterality Date   • JOINT REPLACEMENT  Knee - 2022   • KNEE SURGERY      total 3 knee surgery       Family History   Problem Relation Age of Onset   • Diabetes Father    • Substance Abuse Neg Hx    • Mental illness Neg Hx          Medications have been verified  Objective   /72   Pulse 72   Temp 98 1 °F (36 7 °C)   Resp 16   Ht 5' 8 75" (1 746 m)   Wt 74 8 kg (165 lb)   SpO2 99%   BMI 24 54 kg/m²        Physical Exam     Physical Exam  Vitals reviewed  Constitutional:       General: He is not in acute distress  Appearance: He is well-developed  He is not diaphoretic  HENT:      Head: Normocephalic and atraumatic  Right Ear: Tympanic membrane, ear canal and external ear normal       Left Ear: Tympanic membrane, ear canal and external ear normal       Nose: Nose normal  No mucosal edema or rhinorrhea  Right Sinus: No maxillary sinus tenderness  Left Sinus: No maxillary sinus tenderness  Mouth/Throat:      Mouth: Mucous membranes are not pale and not dry  Dentition: Normal dentition  Pharynx: Uvula midline  No oropharyngeal exudate  Eyes:      General:         Right eye: No discharge  Left eye: No discharge  Extraocular Movements: Extraocular movements intact  Conjunctiva/sclera: Conjunctivae normal       Pupils: Pupils are equal, round, and reactive to light  Neck:      Thyroid: No thyromegaly  Cardiovascular:      Rate and Rhythm: Normal rate and regular rhythm  Heart sounds: Normal heart sounds  No murmur heard  Pulmonary:      Effort: Pulmonary effort is normal  No respiratory distress  Breath sounds: Normal breath sounds  No wheezing or rales  Musculoskeletal:      Cervical back: Normal range of motion and neck supple  Right lower leg: No edema  Left lower leg: No edema  Lymphadenopathy:      Cervical: No cervical adenopathy  Neurological:      General: No focal deficit present  Mental Status: He is alert and oriented to person, place, and time     Psychiatric:         Mood and Affect: Mood normal          Behavior: Behavior normal

## 2023-03-15 NOTE — PATIENT INSTRUCTIONS
Patient medically stable  No current medications  Preadmission testing is all satisfactory  Patient is cleared for proposed left knee procedure

## 2023-12-11 ENCOUNTER — CLINICAL SUPPORT (OUTPATIENT)
Dept: FAMILY MEDICINE CLINIC | Facility: CLINIC | Age: 63
End: 2023-12-11
Payer: COMMERCIAL

## 2023-12-11 DIAGNOSIS — Z23 ENCOUNTER FOR IMMUNIZATION: Primary | ICD-10-CM

## 2023-12-11 DIAGNOSIS — Z12.11 SCREENING FOR COLORECTAL CANCER: ICD-10-CM

## 2023-12-11 DIAGNOSIS — Z12.12 SCREENING FOR COLORECTAL CANCER: ICD-10-CM

## 2023-12-11 PROCEDURE — 90632 HEPA VACCINE ADULT IM: CPT

## 2023-12-11 PROCEDURE — 90471 IMMUNIZATION ADMIN: CPT

## 2023-12-13 ENCOUNTER — TELEPHONE (OUTPATIENT)
Age: 63
End: 2023-12-13

## 2023-12-13 NOTE — TELEPHONE ENCOUNTER
ASC Screening    ASC Screening  BMI > than 45: No  Are you currently pregnant?: No  Do you rely on a wheelchair for mobility?: No  Do you need oxygen during the day?: No  Have you ever been informed by anesthesia that you have a difficult airway?: No  Have you been diagnosed with End Stage Renal Disease (ESRD)?: No  Are you actively on dialysis?: No  Have you been diagnosed with Pulmonary Hypertension?: No  Do you have a pacemaker or an Automatic Implantable Cardioverter Defibrillator (AICD)?: No  Have you ever had an organ transplant?: No  Have you had a stroke, heart attack, myocardial infarction (MI) within the last 6 months?: No  Have you ever been diagnosed with Aortic Stenosis?: No  Have you ever been diagnosed  with Congestive Heart Failure?: No  Have you ever been diagnosed with a heart valve disease?: No  Are you Diabetic?: No  If you are Diabetic, has your A1C been greater than 12 within the last six months?: N/A show

## 2023-12-13 NOTE — TELEPHONE ENCOUNTER
Scheduled date of colonoscopy (as of today): 1/31/24  Physician performing colonoscopy: [facility]  Location of colonoscopy: [facility]  Bowel prep reviewed with patient: DAYANARA Gutierrez  Instructions reviewed with patient by: PIERO  Clearances: N/A

## 2023-12-13 NOTE — TELEPHONE ENCOUNTER
12/13/23  Screened by: Elnora Litten    Referring Provider     Pre- Screening: There is no height or weight on file to calculate BMI. Has patient been referred for a routine screening Colonoscopy? yes  Is the patient between 43-73 years old? yes      Previous Colonoscopy yes   If yes:    Date: 4/2018    Facility:     Reason:       SCHEDULING STAFF: If the patient is between 39yrs-51yrs, please advise patient to confirm benefits/coverage with their insurance company for a routine screening colonoscopy, some insurance carriers will only cover at [address] [place] [address] or older. If the patient is over 66years old, please schedule an office visit. Does the patient want to see a Gastroenterologist prior to their procedure OR are they having any GI symptoms? no    Has the patient been hospitalized or had abdominal surgery in the past 6 months? no    Does the patient use supplemental oxygen? no    Does the patient take Coumadin, Lovenox, Plavix, Elliquis, Xarelto, or other blood thinning medication? no    Has the patient had a stroke, cardiac event, or stent placed in the past year? no    SCHEDULING STAFF: If patient answers NO to above questions, then schedule procedure. If patient answers YES to above questions, then schedule office appointment. PASSED OA, 150LBS, 5'8    If patient is between 45yrs - 49yrs, please advise patient that we will have to confirm benefits & coverage with their insurance company for a routine screening colonoscopy.

## 2024-01-17 ENCOUNTER — ANESTHESIA EVENT (OUTPATIENT)
Dept: ANESTHESIOLOGY | Facility: AMBULATORY SURGERY CENTER | Age: 64
End: 2024-01-17

## 2024-01-17 ENCOUNTER — ANESTHESIA (OUTPATIENT)
Dept: ANESTHESIOLOGY | Facility: AMBULATORY SURGERY CENTER | Age: 64
End: 2024-01-17

## 2024-01-22 ENCOUNTER — TELEPHONE (OUTPATIENT)
Dept: GASTROENTEROLOGY | Facility: CLINIC | Age: 64
End: 2024-01-22

## 2024-01-22 NOTE — TELEPHONE ENCOUNTER
Procedure confirmed  Colonoscopy     Via: [facility]    Instructions given: [facility]     Prep Given: Miralax/Dulcolax    Call the office if there are any questions.

## 2024-01-31 ENCOUNTER — HOSPITAL ENCOUNTER (OUTPATIENT)
Dept: GASTROENTEROLOGY | Facility: AMBULATORY SURGERY CENTER | Age: 64
Discharge: HOME | End: 2024-01-31
Payer: COMMERCIAL

## 2024-01-31 ENCOUNTER — ANESTHESIA (OUTPATIENT)
Dept: GASTROENTEROLOGY | Facility: AMBULATORY SURGERY CENTER | Age: 64
End: 2024-01-31

## 2024-01-31 ENCOUNTER — ANESTHESIA EVENT (OUTPATIENT)
Dept: GASTROENTEROLOGY | Facility: AMBULATORY SURGERY CENTER | Age: 64
End: 2024-01-31

## 2024-01-31 VITALS
SYSTOLIC BLOOD PRESSURE: 133 MMHG | RESPIRATION RATE: 24 BRPM | BODY MASS INDEX: 23.49 KG/M2 | DIASTOLIC BLOOD PRESSURE: 76 MMHG | WEIGHT: 155 LBS | HEIGHT: 68 IN | TEMPERATURE: 98.4 F | HEART RATE: 58 BPM | OXYGEN SATURATION: 94 %

## 2024-01-31 DIAGNOSIS — Z12.11 SCREENING FOR COLON CANCER: ICD-10-CM

## 2024-01-31 PROCEDURE — 45380 COLONOSCOPY AND BIOPSY: CPT | Performed by: INTERNAL MEDICINE

## 2024-01-31 PROCEDURE — 88305 TISSUE EXAM BY PATHOLOGIST: CPT | Performed by: PATHOLOGY

## 2024-01-31 RX ORDER — LIDOCAINE HYDROCHLORIDE 10 MG/ML
INJECTION, SOLUTION EPIDURAL; INFILTRATION; INTRACAUDAL; PERINEURAL AS NEEDED
Status: DISCONTINUED | OUTPATIENT
Start: 2024-01-31 | End: 2024-01-31

## 2024-01-31 RX ORDER — SODIUM CHLORIDE, SODIUM LACTATE, POTASSIUM CHLORIDE, CALCIUM CHLORIDE 600; 310; 30; 20 MG/100ML; MG/100ML; MG/100ML; MG/100ML
50 INJECTION, SOLUTION INTRAVENOUS CONTINUOUS
Status: DISCONTINUED | OUTPATIENT
Start: 2024-01-31 | End: 2024-02-04 | Stop reason: HOSPADM

## 2024-01-31 RX ORDER — PROPOFOL 10 MG/ML
INJECTION, EMULSION INTRAVENOUS AS NEEDED
Status: DISCONTINUED | OUTPATIENT
Start: 2024-01-31 | End: 2024-01-31

## 2024-01-31 RX ADMIN — LIDOCAINE HYDROCHLORIDE 50 MG: 10 INJECTION, SOLUTION EPIDURAL; INFILTRATION; INTRACAUDAL; PERINEURAL at 13:52

## 2024-01-31 RX ADMIN — PROPOFOL 20 MG: 10 INJECTION, EMULSION INTRAVENOUS at 14:08

## 2024-01-31 RX ADMIN — PROPOFOL 150 MG: 10 INJECTION, EMULSION INTRAVENOUS at 13:52

## 2024-01-31 RX ADMIN — PROPOFOL 30 MG: 10 INJECTION, EMULSION INTRAVENOUS at 13:54

## 2024-01-31 RX ADMIN — PROPOFOL 50 MG: 10 INJECTION, EMULSION INTRAVENOUS at 14:00

## 2024-01-31 RX ADMIN — PROPOFOL 30 MG: 10 INJECTION, EMULSION INTRAVENOUS at 13:56

## 2024-01-31 RX ADMIN — SODIUM CHLORIDE, SODIUM LACTATE, POTASSIUM CHLORIDE, CALCIUM CHLORIDE 50 ML/HR: 600; 310; 30; 20 INJECTION, SOLUTION INTRAVENOUS at 13:27

## 2024-01-31 RX ADMIN — PROPOFOL 20 MG: 10 INJECTION, EMULSION INTRAVENOUS at 14:04

## 2024-01-31 RX ADMIN — PROPOFOL 20 MG: 10 INJECTION, EMULSION INTRAVENOUS at 14:02

## 2024-01-31 NOTE — ANESTHESIA POSTPROCEDURE EVALUATION
Post-Op Assessment Note    CV Status:  Stable  Pain Score: 0    Pain management: adequate       Mental Status:  Sleepy   Hydration Status:  Euvolemic   PONV Controlled:  None   Airway Patency:  Patent     Post Op Vitals Reviewed: Yes    No anethesia notable event occurred.    Staff: Anesthesiologist, CRNA   Comments: REPORT GIVEN TO rn; aiden; shara              BP   104/63   Temp      Pulse  65   Resp   18   SpO2   98

## 2024-01-31 NOTE — ANESTHESIA PREPROCEDURE EVALUATION
Procedure:  COLONOSCOPY    Relevant Problems   CARDIO   (+) Borderline hyperlipidemia      MUSCULOSKELETAL   (+) Primary osteoarthritis of both knees        Physical Exam    Airway    Mallampati score: II  TM Distance: >3 FB  Neck ROM: full     Dental   No notable dental hx     Cardiovascular      Pulmonary      Other Findings        Anesthesia Plan  ASA Score- 2     Anesthesia Type- IV sedation with anesthesia with ASA Monitors.         Additional Monitors:     Airway Plan:            Plan Factors-    Chart reviewed.    Patient summary reviewed.    Patient is not a current smoker.              Induction- intravenous.    Postoperative Plan-     Informed Consent- Anesthetic plan and risks discussed with patient.  I personally reviewed this patient with the CRNA. Discussed and agreed on the Anesthesia Plan with the CRNA..

## 2024-02-05 PROCEDURE — 88305 TISSUE EXAM BY PATHOLOGIST: CPT | Performed by: PATHOLOGY

## 2024-02-05 NOTE — RESULT ENCOUNTER NOTE
To: Lele Winter    The polyp removed during your colonoscopy was completely benign colon tissue and not precancerous polyp tissue.  Repeat colonoscopy is recommended in 5 years due to your previous history of precancerous polyps.    Best regards,    Carmina Monreal MD

## 2024-10-18 ENCOUNTER — TELEPHONE (OUTPATIENT)
Dept: SURGERY | Facility: HOSPITAL | Age: 64
End: 2024-10-18

## 2024-10-18 NOTE — TELEPHONE ENCOUNTER
Left message regarding cancellation of hernia screening.  Patient can schedule an appointment with Dr. Villatoro or wait for future screenings.

## 2025-02-01 ENCOUNTER — APPOINTMENT (EMERGENCY)
Dept: RADIOLOGY | Facility: HOSPITAL | Age: 65
End: 2025-02-01
Payer: COMMERCIAL

## 2025-02-01 ENCOUNTER — OFFICE VISIT (OUTPATIENT)
Dept: URGENT CARE | Facility: CLINIC | Age: 65
End: 2025-02-01
Payer: COMMERCIAL

## 2025-02-01 ENCOUNTER — HOSPITAL ENCOUNTER (EMERGENCY)
Facility: HOSPITAL | Age: 65
Discharge: HOME | End: 2025-02-01
Attending: EMERGENCY MEDICINE | Admitting: EMERGENCY MEDICINE
Payer: COMMERCIAL

## 2025-02-01 VITALS
WEIGHT: 160 LBS | SYSTOLIC BLOOD PRESSURE: 168 MMHG | RESPIRATION RATE: 16 BRPM | TEMPERATURE: 98.1 F | DIASTOLIC BLOOD PRESSURE: 84 MMHG | HEIGHT: 68 IN | OXYGEN SATURATION: 100 % | HEART RATE: 62 BPM | BODY MASS INDEX: 24.25 KG/M2

## 2025-02-01 VITALS
TEMPERATURE: 98.1 F | BODY MASS INDEX: 24.25 KG/M2 | WEIGHT: 160 LBS | HEART RATE: 63 BPM | SYSTOLIC BLOOD PRESSURE: 152 MMHG | DIASTOLIC BLOOD PRESSURE: 86 MMHG | RESPIRATION RATE: 18 BRPM | HEIGHT: 68 IN | OXYGEN SATURATION: 99 %

## 2025-02-01 DIAGNOSIS — S61.211A LACERATION OF LEFT INDEX FINGER WITHOUT DAMAGE TO NAIL, FOREIGN BODY PRESENCE UNSPECIFIED, INITIAL ENCOUNTER: Primary | ICD-10-CM

## 2025-02-01 DIAGNOSIS — S61.211A LACERATION OF LEFT INDEX FINGER WITHOUT FOREIGN BODY WITHOUT DAMAGE TO NAIL, INITIAL ENCOUNTER: Primary | ICD-10-CM

## 2025-02-01 PROCEDURE — 99284 EMERGENCY DEPT VISIT MOD MDM: CPT | Performed by: EMERGENCY MEDICINE

## 2025-02-01 PROCEDURE — 99283 EMERGENCY DEPT VISIT LOW MDM: CPT

## 2025-02-01 PROCEDURE — 73140 X-RAY EXAM OF FINGER(S): CPT

## 2025-02-01 PROCEDURE — 12001 RPR S/N/AX/GEN/TRNK 2.5CM/<: CPT | Performed by: EMERGENCY MEDICINE

## 2025-02-01 PROCEDURE — 99213 OFFICE O/P EST LOW 20 MIN: CPT | Performed by: PHYSICIAN ASSISTANT

## 2025-02-01 RX ORDER — LIDOCAINE HYDROCHLORIDE 10 MG/ML
10 INJECTION, SOLUTION EPIDURAL; INFILTRATION; INTRACAUDAL; PERINEURAL ONCE
Status: COMPLETED | OUTPATIENT
Start: 2025-02-01 | End: 2025-02-01

## 2025-02-01 RX ORDER — BACITRACIN, NEOMYCIN, POLYMYXIN B 400; 3.5; 5 [USP'U]/G; MG/G; [USP'U]/G
1 OINTMENT TOPICAL ONCE
Status: COMPLETED | OUTPATIENT
Start: 2025-02-01 | End: 2025-02-01

## 2025-02-01 RX ADMIN — BACITRACIN ZINC, NEOMYCIN, POLYMYXIN B SULFAT 1 SMALL APPLICATION: 5000; 3.5; 4 OINTMENT TOPICAL at 21:23

## 2025-02-01 RX ADMIN — LIDOCAINE HYDROCHLORIDE 10 ML: 10 INJECTION, SOLUTION EPIDURAL; INFILTRATION; INTRACAUDAL at 19:48

## 2025-02-02 NOTE — PROGRESS NOTES
[facility]        NAME: Lele Winter is a 64 y.o. male  : 1960    MRN: [id number]  DATE: 2025  TIME: 8:25 PM    Assessment and Plan   Laceration of left index finger without damage to nail, foreign body presence unspecified, initial encounter [S61.211A]  1. Laceration of left index finger without damage to nail, foreign body presence unspecified, initial encounter  Transfer to other facility        Patient was offered to have left   index finger bandaged properly. Patient declined and was sent to ED.     Patient Instructions       Follow up with PCP in 3-5 days.  Proceed to  ER if symptoms worsen.    If tests have been performed at [facility], our office will contact you with results if changes need to be made to the care plan discussed with you at the visit.  You can review your full results on [facility].    Chief Complaint     Chief Complaint   Patient presents with    Finger Laceration     Pt stating he has a big outdoor wood burner, when throwing wood into the burner his finger hit the medal part of the burner and the wood went right on top of his finger. Pt put peroxide to the area.feels pain and tingling to the area.          History of Present Illness       Patient is a pleasant 64-year-old male who presents today for left  index finger laceration.  Patient reports he was outside throwing wood into a wood burner and wood went into his left index finger causing a laceration.  Last Tdap was given in .  Denies any allergies to medication.    Finger Laceration        Review of Systems   Review of Systems   Constitutional: Negative.    Respiratory: Negative.     Cardiovascular: Negative.    Skin:         Laceration on finger pad of left index finger and top of left  index finger.   Psychiatric/Behavioral: Negative.           Current Medications     No current facility-administered medications for this visit.    Current Outpatient Medications:     sildenafil (VIAGRA) 100  "mg tablet, Take 1 tablet (100 mg total) by mouth daily as needed for erectile dysfunction, Disp: 10 tablet, Rfl: 0    Current Allergies     Allergies as of 02/01/2025 - Reviewed 02/01/2025   Allergen Reaction Noted    Kj grass pollen allergen Rash 01/31/2024            The following portions of the patient's history were reviewed and updated as appropriate: allergies, current medications, past family history, past medical history, past social history, past surgical history and problem list.     Past Medical History:   Diagnosis Date    Allergic 1985    Hay    Arthritis 2018    knees    Colon polyp        Past Surgical History:   Procedure Laterality Date    COLONOSCOPY      JOINT REPLACEMENT Bilateral Knee - 2022    KNEE SURGERY      total 3 knee surgery       Family History   Problem Relation Age of Onset    Diabetes Father     Substance Abuse Neg Hx     Mental illness Neg Hx          Medications have been verified.        Objective   /86   Pulse 63   Temp 98.1 °F (36.7 °C)   Resp 18   Ht 5' 8\" (1.727 m)   Wt 72.6 kg (160 lb)   SpO2 99%   BMI 24.33 kg/m²   No LMP for male patient.       Physical Exam     Physical Exam  Vitals and nursing note reviewed.   Constitutional:       Appearance: Normal appearance.   HENT:      Head: Normocephalic.   Cardiovascular:      Rate and Rhythm: Normal rate and regular rhythm.      Heart sounds: Normal heart sounds.   Pulmonary:      Breath sounds: Normal breath sounds. No wheezing.   Musculoskeletal:      Comments: Complex laceration noted on left index finger pad.  Small lac noted by finger nailbed.  Significant pain with resisted left index finger flexion and extension.   Neurological:      General: No focal deficit present.      Mental Status: He is alert and oriented to person, place, and time.   Psychiatric:         Mood and Affect: Mood normal.         Behavior: Behavior normal.                   "

## 2025-02-02 NOTE — ED PROVIDER NOTES
Time reflects when diagnosis was documented in both MDM as applicable and the Disposition within this note       Time User Action Codes Description Comment    2/1/2025  7:46 PM Rachel Crawford Add [S68.221A] Laceration of left index finger without foreign body without damage to nail, initial encounter           ED Disposition       ED Disposition   Discharge    Condition   Stable    Date/Time   Sat Feb 1, 2025  7:46 PM    Comment   Lele Winter discharge to home/self care.                   Assessment & Plan       Medical Decision Making  Assessment and Plan:      Amount and/or Complexity of Data Reviewed  Radiology: ordered and independent interpretation performed.    Risk  OTC drugs.  Prescription drug management.             Medications   lidocaine (PF) (XYLOCAINE-MPF) 1 % injection 10 mL (10 mL Infiltration Given by Other 2/1/25 1948)   neomycin-bacitracin-polymyxin b (NEOSPORIN) ointment 1 small application (1 small application Topical Given 2/1/25 2123)       ED Risk Strat Scores                          SBIRT 20yo+      Flowsheet Row Most Recent Value   Initial Alcohol Screen: US AUDIT-C     1. How often do you have a drink containing alcohol? 0 Filed at: 02/01/2025 1928   2. How many drinks containing alcohol do you have on a typical day you are drinking?  0 Filed at: 02/01/2025 1928   3a. Male UNDER 65: How often do you have five or more drinks on one occasion? 0 Filed at: 02/01/2025 1928   3b. FEMALE Any Age, or MALE 65+: How often do you have 4 or more drinks on one occassion? 0 Filed at: 02/01/2025 1928   Audit-C Score 0 Filed at: 02/01/2025 1928   RIVER: How many times in the past year have you...    Used an illegal drug or used a prescription medication for non-medical reasons? Never Filed at: 02/01/2025 1928                            History of Present Illness       Chief Complaint   Patient presents with    Hand Laceration     Pt states that pt cut left 2nd finger on wood burner. Pt has had a  tetanus in 2023       Past Medical History:   Diagnosis Date    Allergic 1985    Hay    Arthritis 2018    knees    Colon polyp       Past Surgical History:   Procedure Laterality Date    COLONOSCOPY      JOINT REPLACEMENT Bilateral Knee - 2022    KNEE SURGERY      total 3 knee surgery      Family History   Problem Relation Age of Onset    Diabetes Father     Substance Abuse Neg Hx     Mental illness Neg Hx       Social History     Tobacco Use    Smoking status: Never    Smokeless tobacco: Never   Vaping Use    Vaping status: Never Used   Substance Use Topics    Alcohol use: Yes     Alcohol/week: 7.0 standard drinks of alcohol     Types: 7 Glasses of wine per week    Drug use: No      E-Cigarette/Vaping    E-Cigarette Use Never User       E-Cigarette/Vaping Substances      I have reviewed and agree with the history as documented.     Patient is a 64 year old male, left hand dominant, who presents with laceration to the finger pad of his left second finger.  He states that he was trying to close his wood-burning stove and accidentally sliced his finger on a piece of wood/metal at the base of the stove.  Bleeding initially, but now just a bit of trickling.  States that he can still fully move that finger and denies any numbness, weakness.  States that his tetanus is UTD in 2023.        Review of Systems   Skin:  Positive for wound.   Neurological:  Negative for weakness and numbness.           Objective       ED Triage Vitals [02/01/25 1920]   Temperature Pulse Blood Pressure Respirations SpO2 Patient Position - Orthostatic VS   98.1 °F (36.7 °C) 65 (!) 182/96 18 100 % --      Temp Source Heart Rate Source BP Location FiO2 (%) Pain Score    Oral -- -- -- --      Vitals      Date and Time Temp Pulse SpO2 Resp BP Pain Score FACES Pain Rating User   02/01/25 1920 98.1 °F (36.7 °C) 65 100 % 18 182/96 -- -- LD            Physical Exam  Vitals and nursing note reviewed.   Constitutional:       General: He is not in acute  distress.     Appearance: Normal appearance. He is not ill-appearing, toxic-appearing or diaphoretic.   HENT:      Head: Normocephalic and atraumatic.      Mouth/Throat:      Mouth: Mucous membranes are moist.   Eyes:      Conjunctiva/sclera: Conjunctivae normal.      Pupils: Pupils are equal, round, and reactive to light.   Cardiovascular:      Rate and Rhythm: Normal rate and regular rhythm.   Pulmonary:      Effort: Pulmonary effort is normal. No respiratory distress.      Breath sounds: Normal breath sounds. No stridor. No wheezing, rhonchi or rales.   Chest:      Chest wall: No tenderness.   Musculoskeletal:      Left hand: Laceration and tenderness present. No swelling, deformity or bony tenderness. Normal range of motion. Normal strength. Normal sensation. There is no disruption of two-point discrimination. Normal capillary refill. Normal pulse.        Hands:    Skin:     General: Skin is warm and dry.   Neurological:      General: No focal deficit present.      Mental Status: He is alert and oriented to person, place, and time. Mental status is at baseline.         Results Reviewed       None            XR finger second digit-index LEFT   ED Interpretation by Rachel Crawford DO (02/01 2014)   No acute fractures or foreign bodies visualized as interpreted by me independently          Universal Protocol:  Consent: Verbal consent obtained.  Risks and benefits: risks, benefits and alternatives were discussed  Consent given by: patient  Patient understanding: patient states understanding of the procedure being performed  Patient identity confirmed: verbally with patient  Laceration repair    Date/Time: 2/1/2025 7:30 PM    Performed by: Rachel Crawford DO  Authorized by: Rachel Crawford DO  Body area: upper extremity  Location details: left index finger  Laceration length: 2 cm  Foreign bodies: no foreign bodies  Anesthesia: digital block    Anesthesia:  Local Anesthetic: lidocaine 1% without  epinephrine  Anesthetic total: 5 mL    Sedation:  Patient sedated: no        Procedure Details:  Preparation: Patient was prepped and draped in the usual sterile fashion.  Irrigation solution: saline  Irrigation method: syringe  Amount of cleaning: extensive  Skin closure: 4-0 nylon  Number of sutures: 8  Technique: simple  Approximation: loose  Approximation difficulty: complex  Dressing: 4x4 sterile gauze  Patient tolerance: patient tolerated the procedure well with no immediate complications      Digital Block    Date/Time: 2/1/2025 8:01 PM    Performed by: Rachel Crawford DO  Authorized by: Rachel Crawford DO    Consent:     Consent obtained:  Verbal    Consent given by:  Patient    Risks discussed:  Allergic reaction, infection, nerve damage, swelling, unsuccessful block, pain, intravascular injection and bleeding    Alternatives discussed:  No treatment  Indications:     Indications:  Procedural anesthesia  Location:     Block location:  Finger    Finger blocked:  L index finger  Pre-procedure details:     Neurovascular status: intact      Skin preparation:  Alcohol  Procedure details (see MAR for exact dosages):     Needle gauge:  25 G    Anesthetic injected:  Lidocaine 1% w/o epi    Technique:  Four-sided ring block    Injection procedure:  Anatomic landmarks identified, anatomic landmarks palpated, introduced needle, incremental injection and negative aspiration for blood  Post-procedure details:     Outcome:  Anesthesia achieved    Patient tolerance of procedure:  Tolerated well, no immediate complications  Universal Protocol:  Consent: Verbal consent obtained.  Risks and benefits: risks, benefits and alternatives were discussed  Consent given by: patient  Patient identity confirmed: verbally with patient  Laceration repair    Date/Time: 2/1/2025 9:22 PM    Performed by: Rachel Crawford DO  Authorized by: Rachel Crawford DO  Body area: upper extremity  Location details: left index  finger  Laceration length: 0.5 cm  Foreign bodies: no foreign bodies  Anesthesia: digital block    Anesthesia:  Local Anesthetic: lidocaine 1% without epinephrine  Anesthetic total: 5 mL    Sedation:  Patient sedated: no        Procedure Details:  Preparation: Patient was prepped and draped in the usual sterile fashion.  Irrigation solution: saline  Irrigation method: syringe  Amount of cleaning: extensive  Skin closure: 4-0 nylon  Number of sutures: 2  Technique: simple  Approximation: close  Approximation difficulty: simple  Dressing: 4x4 sterile gauze  Patient tolerance: patient tolerated the procedure well with no immediate complications          ED Medication and Procedure Management   Prior to Admission Medications   Prescriptions Last Dose Informant Patient Reported? Taking?   sildenafil (VIAGRA) 100 mg tablet   No No   Sig: Take 1 tablet (100 mg total) by mouth daily as needed for erectile dysfunction      Facility-Administered Medications: None     Patient's Medications   Discharge Prescriptions    No medications on file       ED SEPSIS DOCUMENTATION   Time reflects when diagnosis was documented in both MDM as applicable and the Disposition within this note       Time User Action Codes Description Comment    2/1/2025  7:46 PM Rachel Crawford Add [S61.211A] Laceration of left index finger without foreign body without damage to nail, initial encounter                  Rachel Crawford DO  02/01/25 2123       Rachel Crawford DO  02/01/25 2125

## 2025-02-04 ENCOUNTER — OFFICE VISIT (OUTPATIENT)
Dept: OBGYN CLINIC | Facility: CLINIC | Age: 65
End: 2025-02-04
Payer: COMMERCIAL

## 2025-02-04 VITALS — HEIGHT: 68 IN | BODY MASS INDEX: 24.33 KG/M2

## 2025-02-04 DIAGNOSIS — S61.211A LACERATION OF LEFT INDEX FINGER WITHOUT FOREIGN BODY WITHOUT DAMAGE TO NAIL, INITIAL ENCOUNTER: ICD-10-CM

## 2025-02-04 PROCEDURE — 99203 OFFICE O/P NEW LOW 30 MIN: CPT | Performed by: ORTHOPAEDIC SURGERY

## 2025-02-04 RX ORDER — CEPHALEXIN 500 MG/1
500 CAPSULE ORAL EVERY 6 HOURS SCHEDULED
Qty: 40 CAPSULE | Refills: 0 | Status: SHIPPED | OUTPATIENT
Start: 2025-02-04 | End: 2025-02-14

## 2025-02-04 NOTE — PROGRESS NOTES
ORTHOPAEDIC HAND, WRIST, AND ELBOW OFFICE  VISIT     Name: Lele Winter      : 1960      MRN: [id number]  Encounter Provider: Trina Hazel MD  Encounter Date: 2025   Encounter department: [facility]  :  Assessment & Plan  Laceration of left index finger without foreign body without damage to nail, initial encounter  Subjective history, clinical exam, and diagnostic studies reviewed  Recommended local wound care.  Reviewed wound care instructions.   The patient was given the opportunity to ask questions.  Questions were answered to the patient's satisfaction.  The patient decided to move forward with plan as discussed via shared decision making.  Keflex 500 mg for patient to take 4 times a day was prescribed today.  Follow up in 6 days with Rubén Bran PA-C for suture removal.  I discussed with patient if 2 weeks from now if patient is having complications he may follow-up with me to go over exercises and scar tissue massage.  Orders:    Ambulatory Referral to Orthopedic Surgery              History of Present Illness   HPI  Chief Complaint   Patient presents with    Left Index Finger - Finger Laceration       Lele Winter is a 64 y.o. male who presents for left index finger laceration.  Patient states 3 days ago he was putting wood into his wood burner and hit his thumb while throwing some wood caused him to cut his left index finger.  Patient was seen by the ED where he had sutures done on his left index finger.  Patient states he was not prescribed any antibiotics.      REVIEW OF SYSTEMS:  General: no fever, no chills  HEENT:  No loss of hearing or eyesight problems  Eyes:  No red eyes  Respiratory:  No coughing, shortness of breath or wheezing  Cardiovascular:  No chest pain, no palpitations  GI:  Abdomen soft nontender, denies nausea  Endocrine:  No muscle weakness, no frequent urination, no excessive thirst  Urinary:  No dysuria, no  "incontinence  Musculoskeletal: see HPI and PE  SKIN:  No skin rash, no dry skin  Neurological:  No headaches, no confusion  Psychiatric:  No suicide thoughts, no anxiety, no depression  Review of all other systems is negative    Objective   Ht 5' 8\" (1.727 m)   BMI 24.33 kg/m²      General: well developed and well nourished, alert, oriented times 3, and appears comfortable  Psychiatric: Normal  HEENT: Trachea Midline, No torticollis  Cardiovascular: No discernable arrhythmia  Pulmonary: No wheezing or stridor  Abdomen: No rebound or guarding  Extremities: No peripheral edema  Skin: No masses, erythema, lacerations, fluctation, ulcerations  Neurovascular: Sensation Intact to the Median, Ulnar, Radial Nerve, Motor Intact to the Median, Ulnar, Radial Nerve, and Pulses Intact    Musculoskeletal exam:      LEFT INDEX FINGER:   No deformity.  Hand and fingers were warm and well-perfused.  Capillary refill was brisk.  Full active range of motion of the elbows, forearms, wrists, and fingers.  Left index finger laceration along distal phalanx.  No signs of infection.  Sutures intact.  Laceration is irregular and measures approximately 2.5-3 cm in total length.       STUDIES REVIEWED:  No Studies to review      PROCEDURES PERFORMED:  Procedures  No Procedures performed today      Scribe Attestation      I,:  Marcus Wu am acting as a scribe while in the presence of the attending physician.:       I,:  Trina Hazel MD personally performed the services described in this documentation    as scribed in my presence.:             "

## 2025-02-11 ENCOUNTER — OFFICE VISIT (OUTPATIENT)
Dept: OBGYN CLINIC | Facility: CLINIC | Age: 65
End: 2025-02-11
Payer: COMMERCIAL

## 2025-02-11 VITALS — BODY MASS INDEX: 25.91 KG/M2 | WEIGHT: 171 LBS | HEIGHT: 68 IN

## 2025-02-11 DIAGNOSIS — S61.211D LACERATION OF LEFT INDEX FINGER WITHOUT FOREIGN BODY WITHOUT DAMAGE TO NAIL, SUBSEQUENT ENCOUNTER: Primary | ICD-10-CM

## 2025-02-11 PROBLEM — S61.211A LACERATION OF LEFT INDEX FINGER WITHOUT FOREIGN BODY WITHOUT DAMAGE TO NAIL: Status: ACTIVE | Noted: 2025-02-11

## 2025-02-11 PROCEDURE — 99213 OFFICE O/P EST LOW 20 MIN: CPT | Performed by: ORTHOPAEDIC SURGERY

## 2025-02-11 NOTE — PROGRESS NOTES
Assessment:     1. Laceration of left index finger without foreign body without damage to nail, subsequent encounter        Plan:     Problem List Items Addressed This Visit          Surgery/Wound/Pain    Laceration of left index finger without foreign body without damage to nail - Primary    Findings consistent with left index finger laceration, date of injury February 1, 2025.  Sutures were removed today.  Steri-Strips were applied.  He is to avoid soaking the finger for the next 2 weeks or so.  He may get it wet in the shower and washing hands.  Keep clean and dry.  Discussed importance of working on range of motion, actively and passively.  Follow-up as needed if any problems arise. All patient's questions were answered to his satisfaction.  This note is created using dictation transcription.  It may contain typographical errors, grammatical errors, improperly dictated words, background noise and other errors.             Subjective:     Patient ID: Lele Winter is a 64 y.o. male.  Chief Complaint:  This is a 64-year-old white male who is following up for a left index finger laceration.  Injury occurred on February 1, 2025.  He caught his finger on wood at home.  He was evaluated by hand surgeon Dr. Hazel.  He was placed on oral Keflex at that time.  He is here today for suture removal.  He has no complaints regarding his left index finger.  He has been keeping it clean and dry.  He has some minimal tingling at the tip of his finger at times.      Allergy:  Allergies   Allergen Reactions    Kj Grass Pollen Allergen Rash     Medications:  all current active meds have been reviewed  Past Medical History:  Past Medical History:   Diagnosis Date    Allergic 1985    Hay    Arthritis 2018    knees    Colon polyp     Osteoarthritis 2018    Both knees replaced     Past Surgical History:  Past Surgical History:   Procedure Laterality Date    COLONOSCOPY      JOINT REPLACEMENT Bilateral Knee - 2022    KNEE  "SURGERY      total 3 knee surgery    TENDON REPAIR  2005    Achilles tendon     Family History:  Family History   Problem Relation Age of Onset    Diabetes Father     Substance Abuse Neg Hx     Mental illness Neg Hx      Social History:  Social History     Substance and Sexual Activity   Alcohol Use Yes    Alcohol/week: 7.0 standard drinks of alcohol    Types: 7 Glasses of wine per week     Social History     Substance and Sexual Activity   Drug Use No     Social History     Tobacco Use   Smoking Status Never   Smokeless Tobacco Never     Review of Systems   Constitutional: Negative.    HENT: Negative.     Eyes: Negative.    Respiratory: Negative.     Cardiovascular: Negative.    Gastrointestinal: Negative.    Endocrine: Negative.    Genitourinary: Negative.    Musculoskeletal:  Positive for joint swelling (left index finger). Negative for arthralgias.   Skin: Negative.    Neurological: Negative.    Hematological: Negative.    Psychiatric/Behavioral: Negative.           Objective:  BP Readings from Last 1 Encounters:   02/01/25 168/84      Wt Readings from Last 1 Encounters:   02/11/25 77.6 kg (171 lb)      BMI:   Estimated body mass index is 26 kg/m² as calculated from the following:    Height as of this encounter: 5' 8\" (1.727 m).    Weight as of this encounter: 77.6 kg (171 lb).  BSA:   Estimated body surface area is 1.91 meters squared as calculated from the following:    Height as of this encounter: 5' 8\" (1.727 m).    Weight as of this encounter: 77.6 kg (171 lb).   Physical Exam  Constitutional:       General: He is not in acute distress.     Appearance: He is well-developed.   HENT:      Head: Normocephalic.   Eyes:      Conjunctiva/sclera: Conjunctivae normal.      Pupils: Pupils are equal, round, and reactive to light.   Pulmonary:      Effort: Pulmonary effort is normal. No respiratory distress.   Skin:     General: Skin is warm and dry.   Neurological:      Mental Status: He is alert and oriented to " person, place, and time.   Psychiatric:         Behavior: Behavior normal.       Left Hand Exam     Tenderness   Left hand tenderness location: mildly tender over distal phalanx index finger.     Range of Motion   Hand   MP Index: abnormal   PIP Index: normal   DIP Index: normal     Other   Erythema: absent  Sensation: normal  Pulse: present    Comments:  Healing laceration over palmar aspect of distal phalanx with sutures intact.  No erythema or drainage.  Actively moving index finger  Capillary refill brisk            No new imaging.    Procedure: Sutures removed from left index finger.

## 2025-02-11 NOTE — ASSESSMENT & PLAN NOTE
Findings consistent with left index finger laceration, date of injury February 1, 2025.  Sutures were removed today.  Steri-Strips were applied.  He is to avoid soaking the finger for the next 2 weeks or so.  He may get it wet in the shower and washing hands.  Keep clean and dry.  Discussed importance of working on range of motion, actively and passively.  Follow-up as needed if any problems arise. All patient's questions were answered to his satisfaction.  This note is created using dictation transcription.  It may contain typographical errors, grammatical errors, improperly dictated words, background noise and other errors.

## 2025-03-13 PROBLEM — S61.211A LACERATION OF LEFT INDEX FINGER WITHOUT FOREIGN BODY WITHOUT DAMAGE TO NAIL: Status: RESOLVED | Noted: 2025-02-11 | Resolved: 2025-03-13

## 2025-04-01 ENCOUNTER — OFFICE VISIT (OUTPATIENT)
Dept: GASTROENTEROLOGY | Facility: CLINIC | Age: 65
End: 2025-04-01
Payer: COMMERCIAL

## 2025-04-01 VITALS
SYSTOLIC BLOOD PRESSURE: 150 MMHG | WEIGHT: 172 LBS | BODY MASS INDEX: 26.07 KG/M2 | HEIGHT: 68 IN | DIASTOLIC BLOOD PRESSURE: 82 MMHG

## 2025-04-01 DIAGNOSIS — R13.19 ESOPHAGEAL DYSPHAGIA: Primary | ICD-10-CM
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
## 2025-04-01 DIAGNOSIS — Z86.0100 HISTORY OF COLON POLYPS: ICD-10-CM

## 2025-04-01 DIAGNOSIS — T18.128A FOOD IMPACTION OF ESOPHAGUS, INITIAL ENCOUNTER: ICD-10-CM

## 2025-04-01 PROCEDURE — 99204 OFFICE O/P NEW MOD 45 MIN: CPT | Performed by: INTERNAL MEDICINE

## 2025-04-01 RX ORDER — SODIUM CHLORIDE, SODIUM LACTATE, POTASSIUM CHLORIDE, CALCIUM CHLORIDE 600; 310; 30; 20 MG/100ML; MG/100ML; MG/100ML; MG/100ML
125 INJECTION, SOLUTION INTRAVENOUS CONTINUOUS
OUTPATIENT
Start: 2025-04-01

## 2025-04-01 NOTE — LETTER
April 1, 2025     MOJGAN Fernandez  [address] [address]    Patient: Lele Winter   YOB: 1960   Date of Visit: 4/1/2025       Dear Dr. Bone:    Thank you for referring Lele Winter to me for evaluation. Below are my notes for this consultation.    If you have questions, please do not hesitate to call me. I look forward to following your patient along with you.         Sincerely,        Carmina Monreal MD        CC: No Recipients    Carmina Monreal MD  4/1/2025  3:17 PM  Sign when Signing Visit    [facility] - Outpatient Consultation  Lele Winter 65 y.o. male MRN: [id number]  Encounter: [id number]    ASSESSMENT AND PLAN:      1. Esophageal dysphagia (Primary)  - EGD; Future  2. Food impaction of esophagus, initial encounter  65-year-old male with no history of GERD or tobacco presents with acute esophageal obstruction with solid food impaction.  Resolved on its own with emesis.  States he is really felt any discomfort with swallowing in the past.  No classical symptoms of dysphagia.  Differential diagnosis includes peptic stricture, less likely neoplasm or Zenker's, very possibly eosinophilic esophagitis with history of some allergic issues.  Will hold on acid suppression pending endoscopic evaluation.  Cut and chew food well, take time eating.  Encourage fluids with meals.  Schedule EGD at [facility].    3. History of colon polyps  Up-to-date with surveillance for colonic neoplasia.  Colonoscopy every 5 years recommended.  Next colonoscopy due January 2029      Followup Appointment: About 3 months pending result of endoscopy  ______________________________________________________________________    Chief Complaint   Patient presents with   • follow up [facility] ER     Patient has not had any difficulty swallowing since, only other time he has this is when eating granny saab apples       HPI:   Lele Winter is a 65 y.o.  "year old male who presents after presenting to the emergency room with a food impaction.  Last Friday eating dinner.  [place] steak caught in throat.  Felt base of throat.  Had trouble clearing saliva.  Able to breath.  Vomited and felt well.  Has had rare episode of food difficult  clearing.  Feels tightening in throat wit granny saba apples.  No history of GERD or heartburn.  Appetite and weight stable.  Felt chest soreness from retching.  No significant allergy history , possibly hay and apples as  granny saab.  Stools normal, no melena  or heme.  No tobacco.  One beer a day.      Historical Information  Past Medical History:   Diagnosis Date   • Allergic 1985    Hay   • Arthritis 2018    knees   • Colon polyp    • Osteoarthritis 2018    Both knees replaced     Past Surgical History:   Procedure Laterality Date   • COLONOSCOPY     • JOINT REPLACEMENT Bilateral Knee - 2022   • KNEE SURGERY      total 3 knee surgery   • TENDON REPAIR  2005    Achilles tendon     Social History     Substance and Sexual Activity   Alcohol Use Yes   • Alcohol/week: 7.0 standard drinks of alcohol   • Types: 7 Glasses of wine per week     Social History     Substance and Sexual Activity   Drug Use No     Social History     Tobacco Use   Smoking Status Never   Smokeless Tobacco Never     Family History   Problem Relation Age of Onset   • Diabetes Father    • Substance Abuse Neg Hx    • Mental illness Neg Hx    • Colon cancer Neg Hx    • Colon polyps Neg Hx        Meds/Allergies    Current Outpatient Medications:   •  sildenafil (VIAGRA) 100 mg tablet    Allergies   Allergen Reactions   • Kj Grass Pollen Allergen Rash       PHYSICAL EXAM:    Blood pressure 150/82, height 5' 8\" (1.727 m), weight 78 kg (172 lb). Body mass index is 26.15 kg/m².  General Appearance: NAD, cooperative, alert  Eyes: Anicteric, conjunctiva pink   ENT:  Normocephalic, atraumatic, normal mucosa.    Neck:  Supple, symmetrical, trachea midline,   Resp:  Clear to " "auscultation bilaterally; no rales, rhonchi or wheezing; respirations unlabored   CV:  S1 S2, Regular rate and rhythm; no murmur, rub, or gallop.  GI:  Soft, non-tender, non-distended; normal bowel sounds; no masses, no organomegaly   Rectal: Deferred  Musculoskeletal: No cyanosis, clubbing or edema. Normal ROM.  Skin:  No jaundice, rashes, or lesions   Heme/Lymph: No palpable cervical lymphadenopathy  Psych: Normal affect, good eye contact  Neuro: No gross deficits, AAOx3    Lab Results:   No results found for: \"WBC\", \"HGB\", \"HCT\", \"MCV\", \"PLT\"  Lab Results   Component Value Date    K 4.4 06/23/2020     06/23/2020    CO2 26 06/23/2020    BUN 19 06/23/2020    CREATININE 0.87 06/23/2020    CALCIUM 9.2 03/27/2018    AST 23 06/23/2020    ALT 19 06/23/2020    ALKPHOS 32 (L) 03/27/2018         Radiology Results:   No results found.      REVIEW OF SYSTEMS:    CONSTITUTIONAL: Denies any fever, chills, rigors, and weight loss.  HEENT: No earache or tinnitus. Denies hearing loss or visual disturbances.  CARDIOVASCULAR: No chest pain or palpitations.   RESPIRATORY: Denies any cough, hemoptysis, shortness of breath or dyspnea on exertion.  GASTROINTESTINAL: As noted in the History of Present Illness.   GENITOURINARY: No problems with urination. Denies any hematuria or dysuria.  NEUROLOGIC: No dizziness or vertigo, denies headaches.   MUSCULOSKELETAL: Denies any muscle or joint pain.   SKIN: Denies skin rashes or itching.   ENDOCRINE: Denies excessive thirst. Denies intolerance to heat or cold.  PSYCHOSOCIAL: Denies depression or anxiety. Denies any recent memory loss.     "

## 2025-04-01 NOTE — PATIENT INSTRUCTIONS
Cut and chew food well, take time eating.  Encourage fluids with meals.  Schedule EGD at [facility].

## 2025-04-01 NOTE — PROGRESS NOTES
[facility] - Outpatient Consultation  Lele Winter 65 y.o. male MRN: [id number]  Encounter: [id number]    ASSESSMENT AND PLAN:      1. Esophageal dysphagia (Primary)  - EGD; Future  2. Food impaction of esophagus, initial encounter  65-year-old male with no history of GERD or tobacco presents with acute esophageal obstruction with solid food impaction.  Resolved on its own with emesis.  States he is really felt any discomfort with swallowing in the past.  No classical symptoms of dysphagia.  Differential diagnosis includes peptic stricture, less likely neoplasm or Zenker's, very possibly eosinophilic esophagitis with history of some allergic issues.  Will hold on acid suppression pending endoscopic evaluation.  Cut and chew food well, take time eating.  Encourage fluids with meals.  Schedule EGD at [facility].    3. History of colon polyps  Up-to-date with surveillance for colonic neoplasia.  Colonoscopy every 5 years recommended.  Next colonoscopy due January 2029      Followup Appointment: About 3 months pending result of endoscopy  ______________________________________________________________________    Chief Complaint   Patient presents with    follow up [facility] ER     Patient has not had any difficulty swallowing since, only other time he has this is when eating granny saab apples       HPI:   Lele Winter is a 65 y.o. year old male who presents after presenting to the emergency room with a food impaction.  Last Friday eating dinner.  [place] steak caught in throat.  Felt base of throat.  Had trouble clearing saliva.  Able to breath.  Vomited and felt well.  Has had rare episode of food difficult  clearing.  Feels tightening in throat wit granny saab apples.  No history of GERD or heartburn.  Appetite and weight stable.  Felt chest soreness from retching.  No significant allergy history , possibly hay and apples as  granny saab.  Stools normal, no melena  or heme.  No  "tobacco.  One beer a day.      Historical Information   Past Medical History:   Diagnosis Date    Allergic 1985    Hay    Arthritis 2018    knees    Colon polyp     Osteoarthritis 2018    Both knees replaced     Past Surgical History:   Procedure Laterality Date    COLONOSCOPY      JOINT REPLACEMENT Bilateral Knee - 2022    KNEE SURGERY      total 3 knee surgery    TENDON REPAIR  2005    Achilles tendon     Social History     Substance and Sexual Activity   Alcohol Use Yes    Alcohol/week: 7.0 standard drinks of alcohol    Types: 7 Glasses of wine per week     Social History     Substance and Sexual Activity   Drug Use No     Social History     Tobacco Use   Smoking Status Never   Smokeless Tobacco Never     Family History   Problem Relation Age of Onset    Diabetes Father     Substance Abuse Neg Hx     Mental illness Neg Hx     Colon cancer Neg Hx     Colon polyps Neg Hx        Meds/Allergies     Current Outpatient Medications:     sildenafil (VIAGRA) 100 mg tablet    Allergies   Allergen Reactions    Kj Grass Pollen Allergen Rash       PHYSICAL EXAM:    Blood pressure 150/82, height 5' 8\" (1.727 m), weight 78 kg (172 lb). Body mass index is 26.15 kg/m².  General Appearance: NAD, cooperative, alert  Eyes: Anicteric, conjunctiva pink   ENT:  Normocephalic, atraumatic, normal mucosa.    Neck:  Supple, symmetrical, trachea midline,   Resp:  Clear to auscultation bilaterally; no rales, rhonchi or wheezing; respirations unlabored   CV:  S1 S2, Regular rate and rhythm; no murmur, rub, or gallop.  GI:  Soft, non-tender, non-distended; normal bowel sounds; no masses, no organomegaly   Rectal: Deferred  Musculoskeletal: No cyanosis, clubbing or edema. Normal ROM.  Skin:  No jaundice, rashes, or lesions   Heme/Lymph: No palpable cervical lymphadenopathy  Psych: Normal affect, good eye contact  Neuro: No gross deficits, AAOx3    Lab Results:   No results found for: \"WBC\", \"HGB\", \"HCT\", \"MCV\", \"PLT\"  Lab Results "   Component Value Date    K 4.4 06/23/2020     06/23/2020    CO2 26 06/23/2020    BUN 19 06/23/2020    CREATININE 0.87 06/23/2020    CALCIUM 9.2 03/27/2018    AST 23 06/23/2020    ALT 19 06/23/2020    ALKPHOS 32 (L) 03/27/2018         Radiology Results:   No results found.      REVIEW OF SYSTEMS:    CONSTITUTIONAL: Denies any fever, chills, rigors, and weight loss.  HEENT: No earache or tinnitus. Denies hearing loss or visual disturbances.  CARDIOVASCULAR: No chest pain or palpitations.   RESPIRATORY: Denies any cough, hemoptysis, shortness of breath or dyspnea on exertion.  GASTROINTESTINAL: As noted in the History of Present Illness.   GENITOURINARY: No problems with urination. Denies any hematuria or dysuria.  NEUROLOGIC: No dizziness or vertigo, denies headaches.   MUSCULOSKELETAL: Denies any muscle or joint pain.   SKIN: Denies skin rashes or itching.   ENDOCRINE: Denies excessive thirst. Denies intolerance to heat or cold.  PSYCHOSOCIAL: Denies depression or anxiety. Denies any recent memory loss.

## 2025-04-01 NOTE — H&P (VIEW-ONLY)
[facility] - Outpatient Consultation  Lele Winter 65 y.o. male MRN: [id number]  Encounter: [id number]    ASSESSMENT AND PLAN:      1. Esophageal dysphagia (Primary)  - EGD; Future  2. Food impaction of esophagus, initial encounter  65-year-old male with no history of GERD or tobacco presents with acute esophageal obstruction with solid food impaction.  Resolved on its own with emesis.  States he is really felt any discomfort with swallowing in the past.  No classical symptoms of dysphagia.  Differential diagnosis includes peptic stricture, less likely neoplasm or Zenker's, very possibly eosinophilic esophagitis with history of some allergic issues.  Will hold on acid suppression pending endoscopic evaluation.  Cut and chew food well, take time eating.  Encourage fluids with meals.  Schedule EGD at [facility].    3. History of colon polyps  Up-to-date with surveillance for colonic neoplasia.  Colonoscopy every 5 years recommended.  Next colonoscopy due January 2029      Followup Appointment: About 3 months pending result of endoscopy  ______________________________________________________________________    Chief Complaint   Patient presents with    follow up [facility] ER     Patient has not had any difficulty swallowing since, only other time he has this is when eating granny saab apples       HPI:   Lele Winter is a 65 y.o. year old male who presents after presenting to the emergency room with a food impaction.  Last Friday eating dinner.  [place] steak caught in throat.  Felt base of throat.  Had trouble clearing saliva.  Able to breath.  Vomited and felt well.  Has had rare episode of food difficult  clearing.  Feels tightening in throat wit granny saab apples.  No history of GERD or heartburn.  Appetite and weight stable.  Felt chest soreness from retching.  No significant allergy history , possibly hay and apples as  granny saab.  Stools normal, no melena  or heme.  No  "tobacco.  One beer a day.      Historical Information   Past Medical History:   Diagnosis Date    Allergic 1985    Hay    Arthritis 2018    knees    Colon polyp     Osteoarthritis 2018    Both knees replaced     Past Surgical History:   Procedure Laterality Date    COLONOSCOPY      JOINT REPLACEMENT Bilateral Knee - 2022    KNEE SURGERY      total 3 knee surgery    TENDON REPAIR  2005    Achilles tendon     Social History     Substance and Sexual Activity   Alcohol Use Yes    Alcohol/week: 7.0 standard drinks of alcohol    Types: 7 Glasses of wine per week     Social History     Substance and Sexual Activity   Drug Use No     Social History     Tobacco Use   Smoking Status Never   Smokeless Tobacco Never     Family History   Problem Relation Age of Onset    Diabetes Father     Substance Abuse Neg Hx     Mental illness Neg Hx     Colon cancer Neg Hx     Colon polyps Neg Hx        Meds/Allergies     Current Outpatient Medications:     sildenafil (VIAGRA) 100 mg tablet    Allergies   Allergen Reactions    Kj Grass Pollen Allergen Rash       PHYSICAL EXAM:    Blood pressure 150/82, height 5' 8\" (1.727 m), weight 78 kg (172 lb). Body mass index is 26.15 kg/m².  General Appearance: NAD, cooperative, alert  Eyes: Anicteric, conjunctiva pink   ENT:  Normocephalic, atraumatic, normal mucosa.    Neck:  Supple, symmetrical, trachea midline,   Resp:  Clear to auscultation bilaterally; no rales, rhonchi or wheezing; respirations unlabored   CV:  S1 S2, Regular rate and rhythm; no murmur, rub, or gallop.  GI:  Soft, non-tender, non-distended; normal bowel sounds; no masses, no organomegaly   Rectal: Deferred  Musculoskeletal: No cyanosis, clubbing or edema. Normal ROM.  Skin:  No jaundice, rashes, or lesions   Heme/Lymph: No palpable cervical lymphadenopathy  Psych: Normal affect, good eye contact  Neuro: No gross deficits, AAOx3    Lab Results:   No results found for: \"WBC\", \"HGB\", \"HCT\", \"MCV\", \"PLT\"  Lab Results "   Component Value Date    K 4.4 06/23/2020     06/23/2020    CO2 26 06/23/2020    BUN 19 06/23/2020    CREATININE 0.87 06/23/2020    CALCIUM 9.2 03/27/2018    AST 23 06/23/2020    ALT 19 06/23/2020    ALKPHOS 32 (L) 03/27/2018         Radiology Results:   No results found.      REVIEW OF SYSTEMS:    CONSTITUTIONAL: Denies any fever, chills, rigors, and weight loss.  HEENT: No earache or tinnitus. Denies hearing loss or visual disturbances.  CARDIOVASCULAR: No chest pain or palpitations.   RESPIRATORY: Denies any cough, hemoptysis, shortness of breath or dyspnea on exertion.  GASTROINTESTINAL: As noted in the History of Present Illness.   GENITOURINARY: No problems with urination. Denies any hematuria or dysuria.  NEUROLOGIC: No dizziness or vertigo, denies headaches.   MUSCULOSKELETAL: Denies any muscle or joint pain.   SKIN: Denies skin rashes or itching.   ENDOCRINE: Denies excessive thirst. Denies intolerance to heat or cold.  PSYCHOSOCIAL: Denies depression or anxiety. Denies any recent memory loss.

## 2025-04-08 ENCOUNTER — TELEPHONE (OUTPATIENT)
Dept: GASTROENTEROLOGY | Facility: CLINIC | Age: 65
End: 2025-04-08

## 2025-04-10 ENCOUNTER — ANESTHESIA EVENT (OUTPATIENT)
Dept: ANESTHESIOLOGY | Facility: AMBULATORY SURGERY CENTER | Age: 65
End: 2025-04-10

## 2025-04-10 ENCOUNTER — ANESTHESIA (OUTPATIENT)
Dept: ANESTHESIOLOGY | Facility: AMBULATORY SURGERY CENTER | Age: 65
End: 2025-04-10

## 2025-04-24 ENCOUNTER — ANESTHESIA (OUTPATIENT)
Dept: GASTROENTEROLOGY | Facility: AMBULATORY SURGERY CENTER | Age: 65
End: 2025-04-24

## 2025-04-24 ENCOUNTER — HOSPITAL ENCOUNTER (OUTPATIENT)
Dept: GASTROENTEROLOGY | Facility: AMBULATORY SURGERY CENTER | Age: 65
Discharge: HOME | End: 2025-04-24
Payer: COMMERCIAL

## 2025-04-24 ENCOUNTER — ANESTHESIA EVENT (OUTPATIENT)
Dept: GASTROENTEROLOGY | Facility: AMBULATORY SURGERY CENTER | Age: 65
End: 2025-04-24

## 2025-04-24 VITALS
DIASTOLIC BLOOD PRESSURE: 87 MMHG | HEART RATE: 86 BPM | OXYGEN SATURATION: 100 % | BODY MASS INDEX: 26.07 KG/M2 | WEIGHT: 172 LBS | HEIGHT: 68 IN | RESPIRATION RATE: 18 BRPM | TEMPERATURE: 97.3 F | SYSTOLIC BLOOD PRESSURE: 132 MMHG

## 2025-04-24 DIAGNOSIS — R13.19 ESOPHAGEAL DYSPHAGIA: ICD-10-CM

## 2025-04-24 PROCEDURE — 43239 EGD BIOPSY SINGLE/MULTIPLE: CPT | Performed by: INTERNAL MEDICINE

## 2025-04-24 PROCEDURE — 43450 DILATE ESOPHAGUS 1/MULT PASS: CPT | Performed by: INTERNAL MEDICINE

## 2025-04-24 PROCEDURE — 88305 TISSUE EXAM BY PATHOLOGIST: CPT | Performed by: PATHOLOGY

## 2025-04-24 RX ORDER — SODIUM CHLORIDE, SODIUM LACTATE, POTASSIUM CHLORIDE, CALCIUM CHLORIDE 600; 310; 30; 20 MG/100ML; MG/100ML; MG/100ML; MG/100ML
125 INJECTION, SOLUTION INTRAVENOUS CONTINUOUS
Status: DISCONTINUED | OUTPATIENT
Start: 2025-04-24 | End: 2025-04-28 | Stop reason: HOSPADM

## 2025-04-24 RX ORDER — PROPOFOL 10 MG/ML
INJECTION, EMULSION INTRAVENOUS CONTINUOUS PRN
Status: DISCONTINUED | OUTPATIENT
Start: 2025-04-24 | End: 2025-04-24

## 2025-04-24 RX ORDER — PROPOFOL 10 MG/ML
INJECTION, EMULSION INTRAVENOUS AS NEEDED
Status: DISCONTINUED | OUTPATIENT
Start: 2025-04-24 | End: 2025-04-24

## 2025-04-24 RX ORDER — OMEPRAZOLE 20 MG/1
20 TABLET, DELAYED RELEASE ORAL DAILY
Qty: 30 TABLET | Refills: 2 | Status: SHIPPED | OUTPATIENT
Start: 2025-04-24

## 2025-04-24 RX ORDER — LIDOCAINE HYDROCHLORIDE 10 MG/ML
INJECTION, SOLUTION EPIDURAL; INFILTRATION; INTRACAUDAL; PERINEURAL AS NEEDED
Status: DISCONTINUED | OUTPATIENT
Start: 2025-04-24 | End: 2025-04-24

## 2025-04-24 RX ADMIN — PROPOFOL 80 MG: 10 INJECTION, EMULSION INTRAVENOUS at 09:39

## 2025-04-24 RX ADMIN — PROPOFOL 120 MCG/KG/MIN: 10 INJECTION, EMULSION INTRAVENOUS at 09:39

## 2025-04-24 RX ADMIN — LIDOCAINE HYDROCHLORIDE 100 MG: 10 INJECTION, SOLUTION EPIDURAL; INFILTRATION; INTRACAUDAL; PERINEURAL at 09:39

## 2025-04-24 RX ADMIN — SODIUM CHLORIDE, SODIUM LACTATE, POTASSIUM CHLORIDE, CALCIUM CHLORIDE 125 ML/HR: 600; 310; 30; 20 INJECTION, SOLUTION INTRAVENOUS at 08:44

## 2025-04-24 NOTE — ANESTHESIA PREPROCEDURE EVALUATION
Procedure:  EGD    Relevant Problems   CARDIO   (+) Borderline hyperlipidemia      GI/HEPATIC   (+) Esophageal dysphagia      MUSCULOSKELETAL   (+) Primary osteoarthritis of both knees        Physical Exam    Airway    Mallampati score: II  TM Distance: >3 FB  Neck ROM: full     Dental   No notable dental hx     Cardiovascular      Pulmonary      Other Findings        Anesthesia Plan  ASA Score- 2     Anesthesia Type- IV sedation with anesthesia with ASA Monitors.         Additional Monitors:     Airway Plan:            Plan Factors-Exercise tolerance (METS): >4 METS.    Chart reviewed.    Patient summary reviewed.    Patient is not a current smoker.              Induction- intravenous.    Postoperative Plan-         Informed Consent- Anesthetic plan and risks discussed with patient.  I personally reviewed this patient with the CRNA. Discussed and agreed on the Anesthesia Plan with the CRNA..      NPO Status:  Vitals Value Taken Time   Date of last liquid 04/23/25 04/24/25 0832   Time of last liquid 2000 04/24/25 0832   Date of last solid 04/23/25 04/24/25 0832   Time of last solid 2000 04/24/25 0832

## 2025-04-24 NOTE — INTERVAL H&P NOTE
H&P reviewed. After examining the patient I find no changes in the patients condition since the H&P had been written.    Vitals:    04/24/25 0836   BP: 138/82   Pulse: 63   Resp: 17   Temp:    SpO2: 99%

## 2025-04-24 NOTE — ANESTHESIA POSTPROCEDURE EVALUATION
Post-Op Assessment Note    CV Status:  Stable  Pain Score: 0    Pain management: adequate       Mental Status:  Awake and alert   Hydration Status:  Stable   PONV Controlled:  None   Airway Patency:  Patent     Post Op Vitals Reviewed: Yes    No anethesia notable event occurred.    Staff: CRNA           Last Filed PACU Vitals:  Vitals Value Taken Time   Temp     Pulse 72    /82    Resp 16    SpO2 95%

## 2025-04-29 PROCEDURE — 88305 TISSUE EXAM BY PATHOLOGIST: CPT | Performed by: PATHOLOGY

## 2025-05-02 ENCOUNTER — RESULTS FOLLOW-UP (OUTPATIENT)
Dept: GASTROENTEROLOGY | Facility: CLINIC | Age: 65
End: 2025-05-02

## 2025-05-02 NOTE — RESULT ENCOUNTER NOTE
To: Lele Winter    The biopsies obtained during your endoscopy were negative for any signs of H pylori infection or precancerous changes.  The biopsies in the esophagus did show some  inflammation consistent with eosinophilic esophagitis which is an inflammation that is thought to be an allergic type of reaction, nothing specific as it could be a food or environmental.  There is no change in the recommendations, omeprazole is an appropriate treatment for this. Follow-up as planned.    Best regards,    Carmina Monreal MD

## 2025-07-09 ENCOUNTER — OFFICE VISIT (OUTPATIENT)
Dept: GASTROENTEROLOGY | Facility: CLINIC | Age: 65
End: 2025-07-09
Payer: COMMERCIAL

## 2025-07-09 VITALS
WEIGHT: 172.2 LBS | SYSTOLIC BLOOD PRESSURE: 126 MMHG | HEIGHT: 68 IN | BODY MASS INDEX: 26.1 KG/M2 | DIASTOLIC BLOOD PRESSURE: 78 MMHG

## 2025-07-09 DIAGNOSIS — R13.19 ESOPHAGEAL DYSPHAGIA: ICD-10-CM

## 2025-07-09 DIAGNOSIS — K21.9 GASTROESOPHAGEAL REFLUX DISEASE, UNSPECIFIED WHETHER ESOPHAGITIS PRESENT: Primary | ICD-10-CM

## 2025-07-09 PROCEDURE — 99213 OFFICE O/P EST LOW 20 MIN: CPT | Performed by: INTERNAL MEDICINE

## 2025-07-09 RX ORDER — UPADACITINIB 15 MG/1
TABLET, EXTENDED RELEASE ORAL
COMMUNITY
Start: 2025-06-09

## 2025-07-09 NOTE — LETTER
2025     MOJGAN Fernandez  [address] [address]    Patient: Lele Winter   YOB: 1960   Date of Visit: 2025       Dear MOJGAN Schmitz:    Thank you for referring Lele Winter to me for evaluation. Below are my notes for this consultation.    If you have questions, please do not hesitate to call me. I look forward to following your patient along with you.         Sincerely,        Carmina Monreal MD        CC: No Recipients    Carmina Monreal MD  2025  8:37 AM  Sign when Signing Visit  Name: Lele Winter      : 1960      MRN: [id number]  Encounter Provider: Carmina Monreal MD  Encounter Date: 2025   Encounter department: [facility]  :  Assessment & Plan  Gastroesophageal reflux disease, unspecified whether esophagitis present  Continues to have no significant GERD symptoms or dyspepsia.  Dysphagia has resolved after EGD with dilatation.  Biopsies revealed slight increase in eosinophils at the EG junction but not midesophagus.  Suspect this is from reflux, possible EOE.  Given concerns about allergic reaction to PPI, will change therapy to famotidine.  If remains asymptomatic after a few months, will observe on dietary therapy alone.  Continue reflux diet and precautions.  Try to limit caffeine and fatty foods.  Cut and chew food well, take time eating.  Stop Prilosec/omeprazole.  Start Pepcid/famotidine 20 mg daily.  If no symptoms in 3 months, okay to discontinue famotidine.  Call if symptoms return.       Esophageal dysphagia  See above           History of Present Illness  Lele Winter is a 65 y.o. male who presents of GERD and dysphagia.  HPI  History obtained from: patient  No further dysphagia.  No heartburn of dyspepsia.  Took Prilosec and flared eczema.  Now on Rinvoq.  Discussed EGD and biopsy results.  Discussed reflux diet.  Discussed acid suppression.  Review  "of Systems A complete review of systems is negative other than that noted above in the HPI.      Current Medications[1]  Objective  /78   Ht 5' 8\" (1.727 m)   Wt 78.1 kg (172 lb 3.2 oz)   BMI 26.18 kg/m²     Physical Exam General Appearance: NAD, cooperative, alert  Eyes: Anicteric, conjunctiva pink  ENT:  Normocephalic, atraumatic, normal mucosa.    Neck:  Supple, symmetrical, trachea midline  Resp:  Clear to auscultation bilaterally; no rales, rhonchi or wheezing; respirations unlabored   CV:  S1 S2, Regular rate and rhythm; no murmur, rub, or gallop.  GI:  Soft, non-tender, non-distended; normal bowel sounds; no masses, no organomegaly   Rectal: Deferred  Musculoskeletal: No cyanosis, clubbing or edema. Normal ROM.  Skin:  No jaundice, rashes, or lesions   Heme/Lymph: No palpable cervical lymphadenopathy  Psych: Normal affect, good eye contact  Neuro: No gross deficits, AAOx3       Lab Results: I personally reviewed relevant lab results.       Results for orders placed during the hospital encounter of 04/24/25    EGD    Impression  Irregular Z-line; performed cold forceps biopsy  The middle third of the esophagus appeared normal. Performed random biopsy to rule out eosinophilic esophagitis.  Small type I hiatal hernia  The stomach appeared normal. Performed random biopsy to rule out H. pylori.  The duodenum appeared normal.      RECOMMENDATION:  Reflux diet and precautions, cut and chew food well, take time eating.  Omeprazole 20 mg p.o. daily.  GI office follow-up in 2 to 3 months.            Carmina Monreal MD      Pathology reveals increase eosinophils at EG junction but not in midesophagus.  Gastric biopsies negative for H. pylori.                 [1]   Current Outpatient Medications   Medication Sig Dispense Refill   • Rinvoq 15 MG TB24      • omeprazole (PriLOSEC OTC) 20 MG tablet Take 1 tablet (20 mg total) by mouth daily 30 tablet 2   • sildenafil (VIAGRA) 100 mg tablet Take 1 tablet (100 mg " total) by mouth daily as needed for erectile dysfunction 10 tablet 0     No current facility-administered medications for this visit.

## 2025-07-09 NOTE — PROGRESS NOTES
"Name: Lele Winter      : 1960      MRN: [id number]  Encounter Provider: Carmina Monreal MD  Encounter Date: 2025   Encounter department: [facility]  :  Assessment & Plan  Gastroesophageal reflux disease, unspecified whether esophagitis present  Continues to have no significant GERD symptoms or dyspepsia.  Dysphagia has resolved after EGD with dilatation.  Biopsies revealed slight increase in eosinophils at the EG junction but not midesophagus.  Suspect this is from reflux, possible EOE.  Given concerns about allergic reaction to PPI, will change therapy to famotidine.  If remains asymptomatic after a few months, will observe on dietary therapy alone.  Continue reflux diet and precautions.  Try to limit caffeine and fatty foods.  Cut and chew food well, take time eating.  Stop Prilosec/omeprazole.  Start Pepcid/famotidine 20 mg daily.  If no symptoms in 3 months, okay to discontinue famotidine.  Call if symptoms return.       Esophageal dysphagia  See above           History of Present Illness   Lele Winter is a 65 y.o. male who presents of GERD and dysphagia.  HPI  History obtained from: patient  No further dysphagia.  No heartburn of dyspepsia.  Took Prilosec and flared eczema.  Now on Rinvoq.  Discussed EGD and biopsy results.  Discussed reflux diet.  Discussed acid suppression.  Review of Systems A complete review of systems is negative other than that noted above in the HPI.      Current Medications[1]  Objective   /78   Ht 5' 8\" (1.727 m)   Wt 78.1 kg (172 lb 3.2 oz)   BMI 26.18 kg/m²     Physical Exam General Appearance: NAD, cooperative, alert  Eyes: Anicteric, conjunctiva pink  ENT:  Normocephalic, atraumatic, normal mucosa.    Neck:  Supple, symmetrical, trachea midline  Resp:  Clear to auscultation bilaterally; no rales, rhonchi or wheezing; respirations unlabored   CV:  S1 S2, Regular rate and rhythm; no murmur, rub, or gallop.  GI:  Soft, " non-tender, non-distended; normal bowel sounds; no masses, no organomegaly   Rectal: Deferred  Musculoskeletal: No cyanosis, clubbing or edema. Normal ROM.  Skin:  No jaundice, rashes, or lesions   Heme/Lymph: No palpable cervical lymphadenopathy  Psych: Normal affect, good eye contact  Neuro: No gross deficits, AAOx3       Lab Results: I personally reviewed relevant lab results.       Results for orders placed during the hospital encounter of 04/24/25    EGD    Impression  Irregular Z-line; performed cold forceps biopsy  The middle third of the esophagus appeared normal. Performed random biopsy to rule out eosinophilic esophagitis.  Small type I hiatal hernia  The stomach appeared normal. Performed random biopsy to rule out H. pylori.  The duodenum appeared normal.      RECOMMENDATION:  Reflux diet and precautions, cut and chew food well, take time eating.  Omeprazole 20 mg p.o. daily.  GI office follow-up in 2 to 3 months.            Carmina Monreal MD      Pathology reveals increase eosinophils at EG junction but not in midesophagus.  Gastric biopsies negative for H. pylori.                 [1]   Current Outpatient Medications   Medication Sig Dispense Refill    Rinvoq 15 MG TB24       omeprazole (PriLOSEC OTC) 20 MG tablet Take 1 tablet (20 mg total) by mouth daily 30 tablet 2    sildenafil (VIAGRA) 100 mg tablet Take 1 tablet (100 mg total) by mouth daily as needed for erectile dysfunction 10 tablet 0     No current facility-administered medications for this visit.

## 2025-07-09 NOTE — PATIENT INSTRUCTIONS
Continue reflux diet and precautions.  Try to limit caffeine and fatty foods.  Cut and chew food well, take time eating.  Stop Prilosec/omeprazole.  Start Pepcid/famotidine 20 mg daily.  If no symptoms in 3 months, okay to discontinue famotidine.  Call if symptoms return.